# Patient Record
Sex: FEMALE | Race: BLACK OR AFRICAN AMERICAN | Employment: UNEMPLOYED | URBAN - METROPOLITAN AREA
[De-identification: names, ages, dates, MRNs, and addresses within clinical notes are randomized per-mention and may not be internally consistent; named-entity substitution may affect disease eponyms.]

---

## 2018-02-09 LAB
CHLAMYDIA, EXTERNAL: NEGATIVE
HBSAG, EXTERNAL: NEGATIVE
HIV, EXTERNAL: NONREACTIVE
N. GONORRHEA, EXTERNAL: NEGATIVE
RUBELLA, EXTERNAL: NORMAL

## 2018-06-12 LAB
ANTIBODY SCREEN, EXTERNAL: NEGATIVE
T. PALLIDUM, EXTERNAL: NEGATIVE

## 2018-07-25 LAB — GRBS, EXTERNAL: POSITIVE

## 2018-08-14 ENCOUNTER — HOSPITAL ENCOUNTER (EMERGENCY)
Age: 38
Discharge: HOME | End: 2018-08-14
Attending: OBSTETRICS & GYNECOLOGY | Admitting: OBSTETRICS & GYNECOLOGY
Payer: MEDICAID

## 2018-08-14 VITALS
SYSTOLIC BLOOD PRESSURE: 120 MMHG | HEIGHT: 72 IN | BODY MASS INDEX: 35.49 KG/M2 | WEIGHT: 262 LBS | RESPIRATION RATE: 16 BRPM | DIASTOLIC BLOOD PRESSURE: 65 MMHG | HEART RATE: 100 BPM | TEMPERATURE: 98.3 F

## 2018-08-14 LAB
A1 MICROGLOB PLACENTAL VAG QL: NEGATIVE
APPEARANCE UR: CLEAR
BACTERIA URNS QL MICRO: NEGATIVE /HPF
BILIRUB UR QL: NEGATIVE
CLUE CELLS VAG QL WET PREP: NORMAL
COLOR UR: ABNORMAL
CONTROL LINE PRESENT?: NORMAL
EPITH CASTS URNS QL MICRO: ABNORMAL /LPF
EXPIRATION DATE: NORMAL
GLUCOSE UR STRIP.AUTO-MCNC: NEGATIVE MG/DL
HGB UR QL STRIP: ABNORMAL
HYALINE CASTS URNS QL MICRO: ABNORMAL /LPF (ref 0–5)
INTERNAL NEGATIVE CONTROL: NORMAL
KETONES UR QL STRIP.AUTO: NEGATIVE MG/DL
KIT LOT NO.: NORMAL
LEUKOCYTE ESTERASE UR QL STRIP.AUTO: ABNORMAL
NITRITE UR QL STRIP.AUTO: NEGATIVE
PH UR STRIP: 6 [PH] (ref 5–8)
PROT UR STRIP-MCNC: ABNORMAL MG/DL
RBC #/AREA URNS HPF: ABNORMAL /HPF (ref 0–5)
SP GR UR REFRACTOMETRY: 1.02 (ref 1–1.03)
T VAGINALIS VAG QL WET PREP: NORMAL
UA: UC IF INDICATED,UAUC: ABNORMAL
UROBILINOGEN UR QL STRIP.AUTO: 0.2 EU/DL (ref 0.2–1)
WBC URNS QL MICRO: ABNORMAL /HPF (ref 0–4)

## 2018-08-14 PROCEDURE — 87086 URINE CULTURE/COLONY COUNT: CPT | Performed by: OBSTETRICS & GYNECOLOGY

## 2018-08-14 PROCEDURE — 99285 EMERGENCY DEPT VISIT HI MDM: CPT

## 2018-08-14 PROCEDURE — 87210 SMEAR WET MOUNT SALINE/INK: CPT | Performed by: OBSTETRICS & GYNECOLOGY

## 2018-08-14 PROCEDURE — 84112 EVAL AMNIOTIC FLUID PROTEIN: CPT | Performed by: OBSTETRICS & GYNECOLOGY

## 2018-08-14 PROCEDURE — 87491 CHLMYD TRACH DNA AMP PROBE: CPT | Performed by: OBSTETRICS & GYNECOLOGY

## 2018-08-14 PROCEDURE — 81001 URINALYSIS AUTO W/SCOPE: CPT | Performed by: OBSTETRICS & GYNECOLOGY

## 2018-08-14 RX ORDER — SODIUM CHLORIDE 0.9 % (FLUSH) 0.9 %
SYRINGE (ML) INJECTION
Status: DISCONTINUED
Start: 2018-08-14 | End: 2018-08-14 | Stop reason: HOSPADM

## 2018-08-14 NOTE — DISCHARGE INSTRUCTIONS
Week 37 of Your Pregnancy: Care Instructions  Your Care Instructions    You are near the end of your pregnancy-and you're probably pretty uncomfortable. It may be harder to walk around. Lying down probably isn't comfortable either. You may have trouble getting to sleep or staying asleep. Most women deliver their babies between 40 and 41 weeks. This is a good time to think about packing a bag for the hospital with items you'll need. Then you'll be ready when labor starts. Follow-up care is a key part of your treatment and safety. Be sure to make and go to all appointments, and call your doctor if you are having problems. It's also a good idea to know your test results and keep a list of the medicines you take. How can you care for yourself at home? Learn about breastfeeding  · Breastfeeding is best for your baby and good for you. · Breast milk has antibodies to help your baby fight infections. · Mothers who breastfeed often lose weight faster, because making milk burns calories. · Learning the best ways to hold your baby will make breastfeeding easier. · Let your partner bathe and diaper the baby to keep your partner from feeling left out. Snuggle together when you breastfeed. · You may want to learn how to use a breast pump and store your milk. · If you choose to bottle feed, make the feeding feel like breastfeeding so you can bond with your baby. Always hold your baby and the bottle. Do not prop bottles or let your baby fall asleep with a bottle. Learn about crying  · It is common for babies to cry for 1 to 3 hours a day. Some cry more, some cry less. · Babies don't cry to make you upset or because you are a bad parent. · Crying is how your baby communicates. Your baby may be hungry; have gas; need a diaper change; or feel cold, warm, tired, lonely, or tense. Sometimes babies cry for unknown reasons. · If you respond to your baby's needs, he or she will learn to trust you.   · Try to stay calm when your baby cries. Your baby may get more upset if he or she senses that you are upset. Know how to care for your   · Your baby's umbilical cord stump will drop off on its own, usually between 1 and 2 weeks. To care for your baby's umbilical cord area:  ¨ Clean the area at the bottom of the cord 2 or 3 times a day. ¨ Pay special attention to the area where the cord attaches to the skin. ¨ Keep the diaper folded below the cord. ¨ Use a damp washcloth or cotton ball to sponge bathe your baby until the stump has come off. · Your baby's first dark stool is called meconium. After the meconium is passed, your baby will develop his or her own bowel pattern. ¨ Some babies, especially  babies, have several bowel movements a day. Others have one or two a day, or one every 2 to 3 days. ¨  babies often have loose, yellow stools. Formula-fed babies have more formed stools. ¨ If your baby's stools look like little pellets, he or she is constipated. After 2 days of constipation, call your baby's doctor. · If your baby will be circumcised, you can care for him at home. ¨ Gently rinse his penis with warm water after every diaper change. Do not try to remove the film that forms on the penis. This film will go away on its own. Pat dry. ¨ Put petroleum ointment, such as Vaseline, on the area of the diaper that will touch your baby's penis. This will keep the diaper from sticking to your baby. ¨ Ask the doctor about giving your baby acetaminophen (Tylenol) for pain. Where can you learn more? Go to http://aleksandra-jordi.info/. Enter 68  97 in the search box to learn more about \"Week 37 of Your Pregnancy: Care Instructions. \"  Current as of: 2017  Content Version: 11.7  © 4668-1004 [employer]. Care instructions adapted under license by [employer] (which disclaims liability or warranty for this information).  If you have questions about a medical condition or this instruction, always ask your healthcare professional. [place] [address] any warranty or liability for your use of this information.

## 2018-08-14 NOTE — H&P
EDC:2018  EGA: 37 weeks, 4 days      Primary Provider:  Carlos Navarrete MD      History of Present Illness:   at 37w1d who presents with c/o leaking on herself today. Denies any regular ucs, vb, or vd. Good FM.  was seen in the office yesterday and cervix 50/2/-3        Patient's Prenatal Care with Doctor of Record Carlos Navarrete MD Notable For -    GBS positive RX in labor  Rule out ruptured membranes  ROM at term  Anemia on FE pregnant  High risk pregnancy AMA multigravida_cfDNA NEG XX  DM increased risk early GS wnl  Sickle cell trait FOB NEG_urine culture q trimester:  Spotting complicating pregnancy_reslv'd  Prev LTCS h/o , desires  consented  Uterine anomaly pregnant:  ?arcuate uterus  Asthma pregnant          Impression & Recommendations:    Problem # 1:  Prev LTCS h/o , desires  consented (IGB-335.05) (GTJ31-N34.211)  Multip at term with c/o leaking of fluid. R/o SROM  h/o previous section. desires     neg pool or nitrazine. amnisure neg.   bedside u/s with adequate fluid.    _gonorrhea/ chl sent  _wet prep sent  _no evidence of labor  _urine culture  Orders:  Patient Sent to Hospital (CPT-HOSPGYN)      Problem # 2:  GBS positive RX in labor (NHY-952.64) (SSB58-Z38.82)  group b streptococcus pos  no evidence of srom    Problem # 3:  Uterine anomaly pregnant:  ?arcuate uterus (ICD-654.03) (QKZ33-F65.03)  arcuate uterus    Problem # 4:  Sickle cell trait FOB NEG_urine culture q trimester: (SAB-815.83) (GVQ03-B88.2xx0)    Other Orders:  L&D Outpatient Obs - Low (CPT-AdmitF)      Past Pregnancy History      :  4     Term Births:  3     Premature Births: 0     Living Children: 3     Para:   3     Mult. Births:  0     Prev : 1     Prev.  attempt? success x1     Aborta:  0     Elect. Ab:  0     Spont.  Ab:  0     Ectopics:  0    Pregnancy # 1     Delivery date:   2000     Weeks Gestation: 40      labor:   no     Delivery type:    Anesthesia type:   epidural     Delivery location:   [address] [place] [address]     Infant Sex:  Male     Birth weight:  7lb 3oz     Name:  Shanae Masters     Comments:  Deliv at [place], denies complications    Pregnancy # 2     Delivery date:   2008     Weeks Gestation: 40      labor:   no     Delivery type:   LTCS     Anesthesia type:   epidural     Delivery location:   [address] [place] [address]     Infant Sex:  Female     Birth weight:  6lb 5oz     Name:  Jose Ronaldo     Comments:  Del by Dr. Mitch Fraga, apgars , Indications: NRFS and APA. [place] at term. 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 u/s        ? 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 Allergies    LATEX (DISPOSABLE GLOVES) (Moderate)      Medications Removed from Medication List        Flowsheet View for Follow-up Visit     Estimated weeks of        gestation:  37 4/7     Fetal position:  vertex     Cx Dilation:  2     Cx Effacement: 50%     Cx Station:  -3          Physical Exam     General           General appearance:  no acute distress    Head           Inspection:   normal    Eyes           External:   EOM intact    Extremeties           Extremeties:  1+ edema bilaterally    Psych           Orientation:  oriented to time, place, and person          Mood:  no appearance of anxiety, depression, or agitation    Skin           Inspection:  no rashes, suspicious lesions, or ulcerations    Abdomen           Abdomen:  gravid                  Dilation: : 2                  Effacement:  50%                  Station:  -3                  Presentation:  vertex                  Membranes:  intact            Impression & Recommendations:    Problem # 1:  Prev LTCS h/o , desires  consented (BFD-197.93) (AUT25-Q25.211)  Multip at term with c/o leaking of fluid. R/o SROM  h/o previous section. desires     neg pool or nitrazine.  amnisure neg.   bedside u/s with adequate fluid.    _gonorrhea/ chl sent  _wet prep sent  _no evidence of labor  _urine culture  Orders:  Patient Sent to Hospital (CPT-HOSPGYN)      Problem # 2:  GBS positive RX in labor (TDE-053.30) (HGJ85-D68.82)  group b streptococcus pos  no evidence of srom    Problem # 3:  Uterine anomaly pregnant:  ?arcuate uterus (ICD-654.03) (HKJ40-W24.03)  arcuate uterus    Problem # 4:  Sickle cell trait FOB NEG_urine culture q trimester: (BZQ-562.84) (ZOK71-X76.2xx0)    Other Orders:  L&D Outpatient Obs - Low (CPT-AdmitF)      Medications (at conclusion of this visit)    07/10/2018 SM IRON SLOW RELEASE 160 (50 FE) MG ORAL TABLET EXTENDED RELEASE (FERROUS SULFATE DRIED)   04/26/2018 FLONASE 50 MCG/ACT NASAL SUSPENSION (FLUTICASONE PROPIONATE) 1 spray of 50 ug in each nostril 1-2x/daily  04/26/2018 ZYRTEC ALLERGY 10 MG ORAL CAPSULE (CETIRIZINE HCL) 1 tab po daily  02/20/2018 PREFERAOB +DHA 28-6-1 & 200 MG ORAL (PRENAT ZYVOAA-THUVOZJ-SM-DHA) 1 PO daily          LABORATORY DATA   TEST DATE RESULT   Group B Strep culture 07/25/2018 Positive                                   (Group B Strep Culture Result Field)   Blood Type 02/09/2018 O                                             (Blood Type Result Field)   Rh 02/09/2018 Positive                                   (Rh Result Field)   Rhogam Inj Given 05/11/2016 *   Tdap Vaccine Given 06/12/2018 Vacc.  [address]   Antibody Screen 06/12/2018 Negative   Rubella  [facility] Reference Ranges On or After 3/10/14                  <0.90              Non-immune      0.90 - 0.99     Equivocal      >0.99              Immune    [facility] Reference Ranges  Before 3/10/14           <5                 Non-immune             5 - 9               Equivocal            >9                 Immune  [facility] Reference Ranges       < Or = 0.90       Negative             0.91-1.09          Equivocal            > Or = 1.10       Positive   02/09/2018     1.59     TPA (T Pallidum Antibodies) 06/12/2018 Negative   Serology (RPR) 05/11/2016 *   HBsAg 02/09/2018 Negative   HIV 02/09/2018 Non Reactive   Hemoglobin 06/12/2018 10.6   Hematocrit 06/12/2018 32.3   Platelets 92/80/5812 194 X10E3/UL   TSH 05/25/2017 1.560   Urine Culture 06/12/2018 Negative   GC DNA Probe 02/09/2018 Negative   Chlamydia DNA 02/09/2018 Negative   PAP 05/25/2017 NIL   Flu Vaccine Given 04/16/2018 declined   HGBA1C 02/09/2018 5.1   HGB Electro 01/21/2008 Positive   T4, Free 05/11/2016 *   BG Fasting 05/11/2016 *   GTT 1H 50G 06/12/2018 85   GTT 1H 100G 05/11/2016 *   GTT 2H 100G 05/11/2016 *   GTT 3H 100G 05/11/2016 *   Glucose Plasma 05/11/2016 *   CF Accept or Decline 03/20/2018 declined   CF Screen Result 03/20/2018 Declined   Nuchal Trans 02/09/2018 Declined   AFP Only 03/20/2018 *Screen Negative*   Tetra     AFP Serum 05/11/2016 *   CVS 05/11/2016 *   AFP Amniotic 05/11/2016 *   Amnio Karyo 05/11/2016 *   FISH 05/11/2016 *   GC Culture 05/11/2016 *   Chlamydia Cult 05/11/2016 *   Ureaplasma     Mycoplasma     WBC 02/09/2018 11.9 X10E3/UL   RBC 02/09/2018 4.21 X10E6/UL   MCV 02/09/2018 84   MCH 02/09/2018 27.1   MCHC RBC 02/09/2018 32.1     ULTRASOUND DATA   TEST DATE RESULT   Estimated Fetal Weight 07/31/2018 6467^6432 g&grams                                     Weight % 07/31/2018 77^77% %&percent                                                AFUA 07/31/2018 56.92^34. 8 cm&centimeters                    BPP 07/31/2018 8^8 [n/a]&Not applicable   Cervical Length (mm)             Electronically signed by Jarod Harry MD on 08/14/2018 at 6:11 PM    ________________________________________________________________________

## 2018-08-14 NOTE — IP AVS SNAPSHOT
[address] 
[phone] Patient: Unique Patel MRN: [id number] QQM:[birth year] A check rex indicates which time of day the medication should be taken. My Medications STOP taking these medications   
 albuterol 90 mcg/actuation inhaler Commonly known as:  PROVENTIL HFA, VENTOLIN HFA, PROAIR HFA  
   
  
 cetirizine 10 mg tablet Commonly known as:  ZyrTEC  
   
  
 ferrous sulfate 325 mg (65 mg iron) tablet  
   
  
 fluticasone 50 mcg/actuation nasal spray Commonly known as:  FLONASE  
   
  
 ibuprofen 600 mg tablet Commonly known as:  MOTRIN  
   
  
 oxyCODONE-acetaminophen 5-325 mg per tablet Commonly known as:  PERCOCET  
   
  
 pnv w/o calcium-iron fum-fa 27-1 mg Tab  
   
  
 therapeutic multivitamin tablet Commonly known as:  [facility]

## 2018-08-14 NOTE — IP AVS SNAPSHOT
[facility] 
[phone] Patient: Issac Tom MRN: [id number] RJS:4[birth year]13[birth year]018 About your hospitalization You were admitted on:  N/A You last received care in the:  [facility] 3 LABOR & DELIVERY You were discharged on:  August 14, 2018 Why you were hospitalized Your primary diagnosis was:  Not on File Follow-up Information Follow up With Details Comments Contact Info Mike Figueroa MD In 3 days  [address] [id number] [phone] Discharge Orders None A check rex indicates which time of day the medication should be taken. My Medications STOP taking these medications   
 albuterol 90 mcg/actuation inhaler Commonly known as:  PROVENTIL HFA, VENTOLIN HFA, PROAIR HFA  
   
  
 cetirizine 10 mg tablet Commonly known as:  ZyrTEC  
   
  
 ferrous sulfate 325 mg (65 mg iron) tablet  
   
  
 fluticasone 50 mcg/actuation nasal spray Commonly known as:  FLONASE  
   
  
 ibuprofen 600 mg tablet Commonly known as:  MOTRIN  
   
  
 oxyCODONE-acetaminophen 5-325 mg per tablet Commonly known as:  PERCOCET  
   
  
 pnv w/o calcium-iron fum-fa 27-1 mg Tab  
   
  
 therapeutic multivitamin tablet Commonly known as:  [facility] Discharge Instructions Week 37 of Your Pregnancy: Care Instructions Your Care Instructions You are near the end of your pregnancy-and you're probably pretty uncomfortable. It may be harder to walk around. Lying down probably isn't comfortable either. You may have trouble getting to sleep or staying asleep. Most women deliver their babies between 40 and 41 weeks. This is a good time to think about packing a bag for the hospital with items you'll need. Then you'll be ready when labor starts. Follow-up care is a key part of your treatment and safety.  Be sure to make and go to all appointments, and call your doctor if you are having problems. It's also a good idea to know your test results and keep a list of the medicines you take. How can you care for yourself at home? Learn about breastfeeding · Breastfeeding is best for your baby and good for you. · Breast milk has antibodies to help your baby fight infections. · Mothers who breastfeed often lose weight faster, because making milk burns calories. · Learning the best ways to hold your baby will make breastfeeding easier. · Let your partner bathe and diaper the baby to keep your partner from feeling left out. Snuggle together when you breastfeed. · You may want to learn how to use a breast pump and store your milk. · If you choose to bottle feed, make the feeding feel like breastfeeding so you can bond with your baby. Always hold your baby and the bottle. Do not prop bottles or let your baby fall asleep with a bottle. Learn about crying · It is common for babies to cry for 1 to 3 hours a day. Some cry more, some cry less. · Babies don't cry to make you upset or because you are a bad parent. · Crying is how your baby communicates. Your baby may be hungry; have gas; need a diaper change; or feel cold, warm, tired, lonely, or tense. Sometimes babies cry for unknown reasons. · If you respond to your baby's needs, he or she will learn to trust you. · Try to stay calm when your baby cries. Your baby may get more upset if he or she senses that you are upset. Know how to care for your  · Your baby's umbilical cord stump will drop off on its own, usually between 1 and 2 weeks. To care for your baby's umbilical cord area: ¨ Clean the area at the bottom of the cord 2 or 3 times a day. ¨ Pay special attention to the area where the cord attaches to the skin. ¨ Keep the diaper folded below the cord. ¨ Use a damp washcloth or cotton ball to sponge bathe your baby until the stump has come off. · Your baby's first dark stool is called meconium. After the meconium is passed, your baby will develop his or her own bowel pattern. ¨ Some babies, especially  babies, have several bowel movements a day. Others have one or two a day, or one every 2 to 3 days. ¨  babies often have loose, yellow stools. Formula-fed babies have more formed stools. ¨ If your baby's stools look like little pellets, he or she is constipated. After 2 days of constipation, call your baby's doctor. · If your baby will be circumcised, you can care for him at home. ¨ Gently rinse his penis with warm water after every diaper change. Do not try to remove the film that forms on the penis. This film will go away on its own. Pat dry. ¨ Put petroleum ointment, such as Vaseline, on the area of the diaper that will touch your baby's penis. This will keep the diaper from sticking to your baby. ¨ Ask the doctor about giving your baby acetaminophen (Tylenol) for pain. Where can you learn more? Go to http://aleksandraFantomjordi.info/. Enter 68 21 [id number] in the search box to learn more about \"Week 37 of Your Pregnancy: Care Instructions. \" Current as of: November 21, 2017 Content Version: 11.7 © 4449-6351 Healthwise, Incorporated. Care instructions adapted under license by [employer] (which disclaims liability or warranty for this information). If you have questions about a medical condition or this instruction, always ask your healthcare professional. [address] [address] any warranty or liability for your use of this information. Introducing hospitals & HEALTH SERVICES! [facility] introduces [employer] patient portal. Now you can access parts of your medical record, email your doctor's office, and request medication refills online. 1. In your internet browser, go to [email or web address] 2. Click on the First Time User? Click Here link in the Sign In box.  You will see the New Member Sign Up page. 3. Enter your Ship It Bag Check Access Code exactly as it appears below. You will not need to use this code after youve completed the sign-up process. If you do not sign up before the expiration date, you must request a new code. · Ship It Bag Check Access Code: [email or web address] Expires: 11/12/2018  7:42 PM 
 
4. Enter the last four digits of your Social Security Number (xxxx) and Date of Birth (mm/dd/yyyy) as indicated and click Submit. You will be taken to the next sign-up page. 5. Create a Mementot ID. This will be your Ship It Bag Check login ID and cannot be changed, so think of one that is secure and easy to remember. 6. Create a Mementot password. You can change your password at any time. 7. Enter your Password Reset Question and Answer. This can be used at a later time if you forget your password. 8. Enter your e-mail address. You will receive e-mail notification when new information is available in [facility] [address] [facility]. 9. Click Sign Up. You can now view and download portions of your medical record. 10. Click the Download Summary menu link to download a portable copy of your medical information. If you have questions, please visit the Frequently Asked Questions section of the Ship It Bag Check website. Remember, Ship It Bag Check is NOT to be used for urgent needs. For medical emergencies, dial 911. Now available from your iPhone and Android! Introducing Steve Laura As a [facility] patient, I wanted to make you aware of our electronic visit tool called Steve Laura. [facility] 24/7 allows you to connect within minutes with a medical provider 24 hours a day, seven days a week via a mobile device or tablet or logging into a secure website from your computer. You can access Steve Laura from anywhere in the [place].  
 
A virtual visit might be right for you when you have a simple condition and feel like you just dont want to get out of bed, or cant get away from work for an appointment, when your regular [employer] provider is not available (evenings, weekends or holidays), or when youre out of town and need minor care. Electronic visits cost only $49 and if the [employer] 24/7 provider determines a prescription is needed to treat your condition, one can be electronically transmitted to a nearby pharmacy*. Please take a moment to enroll today if you have not already done so. The enrollment process is free and takes just a few minutes. To enroll, please download the [employer] 24/7 jeanie to your tablet or phone, or visit [email or web address] to enroll on your computer. And, as an [address] [place] [address] patient with a [employer] account, the results of your visits will be scanned into your electronic medical record and your primary care provider will be able to view the scanned results. We urge you to continue to see your regular [employer] provider for your ongoing medical care. And while your primary care provider may not be the one available when you seek a [facility] virtual visit, the peace of mind you get from getting a real diagnosis real time can be priceless. For more information on [employer], view our Frequently Asked Questions (FAQs) at [email or web address]. Sincerely, 
 
Jam Lee MD 
Chief Medical Officer [employer] *:  certain medications cannot be prescribed via [employer] Unresulted Labs-Please follow up with your PCP about these lab tests Order Current Status CHLAMYDIA/GC PCR In process CHLAMYDIA/GC PCR In process CULTURE, URINE In process Providers Seen During Your Hospitalization Provider Specialty Primary office phone Nhi Asencio MD Obstetrics & Gynecology [phone] Your Primary Care Physician (PCP) Primary Care Physician Office Phone Office Fax Mohini Monae [phone] You are allergic to the following Allergen Reactions Latex Rash Itching Recent Documentation Height Weight Breastfeeding? BMI OB Status Smoking Status 1.829 m 118.8 kg No 35.53 kg/m2 Pregnant Never Smoker Emergency Contacts Name Discharge Info Relation Home Work Mobile Juany Washington DISCHARGE CAREGIVER [3] Parent [1] [phone] Zacarias Kaur DISCHARGE CAREGIVER [3] Other Relative [6] [phone] Patient Belongings The following personal items are in your possession at time of discharge: 
  Dental Appliances: None  Visual Aid: Glasses      Home Medications: None   Jewelry: None  Clothing: At bedside    Other Valuables: None  Personal Items Sent to Safe: none Please provide this summary of care documentation to your next provider. Signatures-by signing, you are acknowledging that this After Visit Summary has been reviewed with you and you have received a copy. Patient Signature:  ____________________________________________________________ Date:  ____________________________________________________________  
  
[facility] Provider Signature:  ____________________________________________________________ Date:  ____________________________________________________________

## 2018-08-14 NOTE — PROGRESS NOTES
1540 Patient arrived ambulatory to unit with c/o ROM since this morning around 0730. States positive fetal movement. Denies vaginal bleeding. States some mild contractions off and on for \"2-3 weeks. \"    2151 Oriented to room, monitors applied. [address] [facility] performed. 1600 Amnisure negative    [address] Dr. Sonu Barcenas at bedside, viewed fetal tracing. Discussing POC. Bedside ultrasound. Speculum exam performed by MD with swabs collected. Yunior Barcenas at bedside, discussed lab results. Will discharge home with instructions to follow-up next Monday or Tuesday. 1945 Discharge instructions reviewed with patient. All questions answered. Verbalized understanding. Patient to follow up early next week.

## 2018-08-14 NOTE — PROGRESS NOTES
PN    D/c to home. No evidence of rupture. Reassuring fetal surveillance. Wet prep neg    Gc/chl cxs pending    U/A neg for bacteria and nitrates  Urine cx pending    Labor, rom and [address] precautions.

## 2018-08-15 LAB
BACTERIA SPEC CULT: NORMAL
C TRACH DNA SPEC QL NAA+PROBE: NEGATIVE
CC UR VC: NORMAL
N GONORRHOEA DNA SPEC QL NAA+PROBE: NEGATIVE
SAMPLE TYPE: NORMAL
SERVICE CMNT-IMP: NORMAL
SERVICE CMNT-IMP: NORMAL
SPECIMEN SOURCE: NORMAL

## 2018-08-28 ENCOUNTER — HOSPITAL ENCOUNTER (OUTPATIENT)
Dept: OTHER | Age: 38
Discharge: HOME | DRG: 560 | End: 2018-08-28
Payer: MEDICAID

## 2018-08-28 LAB
ERYTHROCYTE [DISTWIDTH] IN BLOOD BY AUTOMATED COUNT: 13.7 % (ref 11.5–14.5)
HCT VFR BLD AUTO: 33.2 % (ref 35–47)
HGB BLD-MCNC: 10.9 G/DL (ref 11.5–16)
MCH RBC QN AUTO: 28.4 PG (ref 26–34)
MCHC RBC AUTO-ENTMCNC: 32.8 G/DL (ref 30–36.5)
MCV RBC AUTO: 86.5 FL (ref 80–99)
NRBC # BLD: 0 K/UL (ref 0–0.01)
NRBC BLD-RTO: 0 PER 100 WBC
PLATELET # BLD AUTO: 139 K/UL (ref 150–400)
PMV BLD AUTO: 12 FL (ref 8.9–12.9)
RBC # BLD AUTO: 3.84 M/UL (ref 3.8–5.2)
WBC # BLD AUTO: 10.1 K/UL (ref 3.6–11)

## 2018-08-28 PROCEDURE — 85027 COMPLETE CBC AUTOMATED: CPT | Performed by: OBSTETRICS & GYNECOLOGY

## 2018-08-28 PROCEDURE — 36415 COLL VENOUS BLD VENIPUNCTURE: CPT | Performed by: OBSTETRICS & GYNECOLOGY

## 2018-08-29 ENCOUNTER — ANESTHESIA (OUTPATIENT)
Dept: LABOR AND DELIVERY | Age: 38
DRG: 560 | End: 2018-08-29
Payer: MEDICAID

## 2018-08-29 ENCOUNTER — ANESTHESIA EVENT (OUTPATIENT)
Dept: LABOR AND DELIVERY | Age: 38
DRG: 560 | End: 2018-08-29
Payer: MEDICAID

## 2018-08-29 ENCOUNTER — HOSPITAL ENCOUNTER (INPATIENT)
Age: 38
LOS: 2 days | Discharge: HOME | DRG: 560 | End: 2018-08-31
Attending: OBSTETRICS & GYNECOLOGY | Admitting: OBSTETRICS & GYNECOLOGY
Payer: MEDICAID

## 2018-08-29 PROBLEM — Q51.810 ARCUATE UTERUS: Status: ACTIVE | Noted: 2018-08-29

## 2018-08-29 PROBLEM — Z34.90 TERM PREGNANCY: Status: ACTIVE | Noted: 2018-08-29

## 2018-08-29 PROBLEM — Z98.891 PREVIOUS CESAREAN SECTION: Status: ACTIVE | Noted: 2018-08-29

## 2018-08-29 PROCEDURE — 74011250636 HC RX REV CODE- 250/636: Performed by: OBSTETRICS & GYNECOLOGY

## 2018-08-29 PROCEDURE — 10907ZC DRAINAGE OF AMNIOTIC FLUID, THERAPEUTIC FROM PRODUCTS OF CONCEPTION, VIA NATURAL OR ARTIFICIAL OPENING: ICD-10-PCS | Performed by: OBSTETRICS & GYNECOLOGY

## 2018-08-29 PROCEDURE — 3E0R3BZ INTRODUCTION OF ANESTHETIC AGENT INTO SPINAL CANAL, PERCUTANEOUS APPROACH: ICD-10-PCS | Performed by: ANESTHESIOLOGY

## 2018-08-29 PROCEDURE — 74011250636 HC RX REV CODE- 250/636: Performed by: ANESTHESIOLOGY

## 2018-08-29 PROCEDURE — 76060000078 HC EPIDURAL ANESTHESIA

## 2018-08-29 PROCEDURE — 75410000000 HC DELIVERY VAGINAL/SINGLE

## 2018-08-29 PROCEDURE — 77030011943

## 2018-08-29 PROCEDURE — 65410000002 HC RM PRIVATE OB

## 2018-08-29 PROCEDURE — A4300 CATH IMPL VASC ACCESS PORTAL: HCPCS

## 2018-08-29 PROCEDURE — 74011000250 HC RX REV CODE- 250

## 2018-08-29 PROCEDURE — 75410000003 HC RECOV DEL/VAG/CSECN EA 0.5 HR

## 2018-08-29 PROCEDURE — 74011250637 HC RX REV CODE- 250/637: Performed by: OBSTETRICS & GYNECOLOGY

## 2018-08-29 PROCEDURE — 74011000250 HC RX REV CODE- 250: Performed by: ANESTHESIOLOGY

## 2018-08-29 PROCEDURE — 75410000002 HC LABOR FEE PER 1 HR

## 2018-08-29 PROCEDURE — 00HU33Z INSERTION OF INFUSION DEVICE INTO SPINAL CANAL, PERCUTANEOUS APPROACH: ICD-10-PCS | Performed by: ANESTHESIOLOGY

## 2018-08-29 PROCEDURE — 74011250636 HC RX REV CODE- 250/636

## 2018-08-29 PROCEDURE — 3E033VJ INTRODUCTION OF OTHER HORMONE INTO PERIPHERAL VEIN, PERCUTANEOUS APPROACH: ICD-10-PCS | Performed by: OBSTETRICS & GYNECOLOGY

## 2018-08-29 PROCEDURE — 74011000258 HC RX REV CODE- 258: Performed by: OBSTETRICS & GYNECOLOGY

## 2018-08-29 RX ORDER — FENTANYL CITRATE 50 UG/ML
100 INJECTION, SOLUTION INTRAMUSCULAR; INTRAVENOUS ONCE
Status: COMPLETED | OUTPATIENT
Start: 2018-08-29 | End: 2018-08-29

## 2018-08-29 RX ORDER — DOCUSATE SODIUM 100 MG/1
100 CAPSULE, LIQUID FILLED ORAL
Status: DISCONTINUED | OUTPATIENT
Start: 2018-08-29 | End: 2018-08-31 | Stop reason: HOSPADM

## 2018-08-29 RX ORDER — SODIUM CHLORIDE 0.9 % (FLUSH) 0.9 %
5-10 SYRINGE (ML) INJECTION EVERY 8 HOURS
Status: DISCONTINUED | OUTPATIENT
Start: 2018-08-29 | End: 2018-08-31 | Stop reason: HOSPADM

## 2018-08-29 RX ORDER — NALOXONE HYDROCHLORIDE 0.4 MG/ML
0.4 INJECTION, SOLUTION INTRAMUSCULAR; INTRAVENOUS; SUBCUTANEOUS AS NEEDED
Status: DISCONTINUED | OUTPATIENT
Start: 2018-08-29 | End: 2018-08-29 | Stop reason: HOSPADM

## 2018-08-29 RX ORDER — BUTORPHANOL TARTRATE 1 MG/ML
2 INJECTION INTRAMUSCULAR; INTRAVENOUS
Status: DISCONTINUED | OUTPATIENT
Start: 2018-08-29 | End: 2018-08-31 | Stop reason: HOSPADM

## 2018-08-29 RX ORDER — IBUPROFEN 400 MG/1
800 TABLET ORAL EVERY 8 HOURS
Status: DISCONTINUED | OUTPATIENT
Start: 2018-08-29 | End: 2018-08-31 | Stop reason: HOSPADM

## 2018-08-29 RX ORDER — LIDOCAINE HYDROCHLORIDE AND EPINEPHRINE 15; 5 MG/ML; UG/ML
INJECTION, SOLUTION EPIDURAL AS NEEDED
Status: DISCONTINUED | OUTPATIENT
Start: 2018-08-29 | End: 2018-08-29 | Stop reason: HOSPADM

## 2018-08-29 RX ORDER — SIMETHICONE 80 MG
80 TABLET,CHEWABLE ORAL
Status: DISCONTINUED | OUTPATIENT
Start: 2018-08-29 | End: 2018-08-31 | Stop reason: HOSPADM

## 2018-08-29 RX ORDER — SODIUM CHLORIDE 0.9 % (FLUSH) 0.9 %
SYRINGE (ML) INJECTION
Status: DISPENSED
Start: 2018-08-29 | End: 2018-08-30

## 2018-08-29 RX ORDER — TERBUTALINE SULFATE 1 MG/ML
0.25 INJECTION SUBCUTANEOUS AS NEEDED
Status: DISCONTINUED | OUTPATIENT
Start: 2018-08-29 | End: 2018-08-29 | Stop reason: HOSPADM

## 2018-08-29 RX ORDER — OXYTOCIN/RINGER'S LACTATE 20/1000 ML
125-1000 PLASTIC BAG, INJECTION (ML) INTRAVENOUS AS NEEDED
Status: DISCONTINUED | OUTPATIENT
Start: 2018-08-29 | End: 2018-08-31 | Stop reason: HOSPADM

## 2018-08-29 RX ORDER — DIPHENHYDRAMINE HCL 25 MG
25 CAPSULE ORAL
Status: DISCONTINUED | OUTPATIENT
Start: 2018-08-29 | End: 2018-08-31 | Stop reason: HOSPADM

## 2018-08-29 RX ORDER — HYDROCORTISONE ACETATE PRAMOXINE HCL 2.5; 1 G/100G; G/100G
CREAM TOPICAL AS NEEDED
Status: DISCONTINUED | OUTPATIENT
Start: 2018-08-29 | End: 2018-08-31 | Stop reason: HOSPADM

## 2018-08-29 RX ORDER — HYDROCORTISONE 1 %
CREAM (GRAM) TOPICAL AS NEEDED
Status: DISCONTINUED | OUTPATIENT
Start: 2018-08-29 | End: 2018-08-31 | Stop reason: HOSPADM

## 2018-08-29 RX ORDER — OXYTOCIN IN 5 % DEXTROSE 30/500 ML
1-25 PLASTIC BAG, INJECTION (ML) INTRAVENOUS
Status: DISCONTINUED | OUTPATIENT
Start: 2018-08-29 | End: 2018-08-31 | Stop reason: HOSPADM

## 2018-08-29 RX ORDER — FENTANYL CITRATE 50 UG/ML
100 INJECTION, SOLUTION INTRAMUSCULAR; INTRAVENOUS
Status: DISCONTINUED | OUTPATIENT
Start: 2018-08-29 | End: 2018-08-29 | Stop reason: HOSPADM

## 2018-08-29 RX ORDER — FENTANYL/BUPIVACAINE/NS/PF 2-1250MCG
1-16 PREFILLED PUMP RESERVOIR EPIDURAL CONTINUOUS
Status: DISCONTINUED | OUTPATIENT
Start: 2018-08-29 | End: 2018-08-29 | Stop reason: HOSPADM

## 2018-08-29 RX ORDER — SODIUM CHLORIDE, SODIUM LACTATE, POTASSIUM CHLORIDE, CALCIUM CHLORIDE 600; 310; 30; 20 MG/100ML; MG/100ML; MG/100ML; MG/100ML
125 INJECTION, SOLUTION INTRAVENOUS CONTINUOUS
Status: DISCONTINUED | OUTPATIENT
Start: 2018-08-29 | End: 2018-08-31 | Stop reason: HOSPADM

## 2018-08-29 RX ORDER — OXYCODONE AND ACETAMINOPHEN 5; 325 MG/1; MG/1
1 TABLET ORAL
Status: DISCONTINUED | OUTPATIENT
Start: 2018-08-29 | End: 2018-08-31 | Stop reason: HOSPADM

## 2018-08-29 RX ORDER — SODIUM CHLORIDE 0.9 % (FLUSH) 0.9 %
5-10 SYRINGE (ML) INJECTION AS NEEDED
Status: DISCONTINUED | OUTPATIENT
Start: 2018-08-29 | End: 2018-08-31 | Stop reason: HOSPADM

## 2018-08-29 RX ORDER — OXYCODONE AND ACETAMINOPHEN 5; 325 MG/1; MG/1
2 TABLET ORAL
Status: DISCONTINUED | OUTPATIENT
Start: 2018-08-29 | End: 2018-08-31 | Stop reason: HOSPADM

## 2018-08-29 RX ORDER — EPHEDRINE SULFATE 50 MG/ML
10 INJECTION, SOLUTION INTRAVENOUS
Status: DISCONTINUED | OUTPATIENT
Start: 2018-08-29 | End: 2018-08-29 | Stop reason: HOSPADM

## 2018-08-29 RX ORDER — AMMONIA 15 % (W/V)
1 AMPUL (EA) INHALATION AS NEEDED
Status: DISCONTINUED | OUTPATIENT
Start: 2018-08-29 | End: 2018-08-31 | Stop reason: HOSPADM

## 2018-08-29 RX ORDER — OXYTOCIN/RINGER'S LACTATE 20/1000 ML
PLASTIC BAG, INJECTION (ML) INTRAVENOUS
Status: COMPLETED
Start: 2018-08-29 | End: 2018-08-29

## 2018-08-29 RX ORDER — ONDANSETRON 4 MG/1
4 TABLET, ORALLY DISINTEGRATING ORAL
Status: DISCONTINUED | OUTPATIENT
Start: 2018-08-29 | End: 2018-08-31 | Stop reason: HOSPADM

## 2018-08-29 RX ORDER — MISOPROSTOL 200 UG/1
800 TABLET ORAL ONCE
Status: COMPLETED | OUTPATIENT
Start: 2018-08-29 | End: 2018-08-29

## 2018-08-29 RX ORDER — NALBUPHINE HYDROCHLORIDE 10 MG/ML
10 INJECTION, SOLUTION INTRAMUSCULAR; INTRAVENOUS; SUBCUTANEOUS
Status: DISCONTINUED | OUTPATIENT
Start: 2018-08-29 | End: 2018-08-29 | Stop reason: HOSPADM

## 2018-08-29 RX ORDER — BUPIVACAINE HYDROCHLORIDE 2.5 MG/ML
10 INJECTION, SOLUTION EPIDURAL; INFILTRATION; INTRACAUDAL ONCE
Status: COMPLETED | OUTPATIENT
Start: 2018-08-29 | End: 2018-08-29

## 2018-08-29 RX ADMIN — IBUPROFEN 800 MG: 400 TABLET ORAL at 22:48

## 2018-08-29 RX ADMIN — MISOPROSTOL 800 MCG: 200 TABLET ORAL at 18:11

## 2018-08-29 RX ADMIN — Medication 20000 MILLI-UNITS: at 19:17

## 2018-08-29 RX ADMIN — LIDOCAINE HYDROCHLORIDE AND EPINEPHRINE 3 ML: 15; 5 INJECTION, SOLUTION EPIDURAL at 14:53

## 2018-08-29 RX ADMIN — PENICILLIN G POTASSIUM 2.5 MILLION UNITS: 20000000 POWDER, FOR SOLUTION INTRAVENOUS at 16:06

## 2018-08-29 RX ADMIN — PENICILLIN G POTASSIUM 2.5 MILLION UNITS: 20000000 POWDER, FOR SOLUTION INTRAVENOUS at 11:17

## 2018-08-29 RX ADMIN — Medication 10 ML/HR: at 15:05

## 2018-08-29 RX ADMIN — SODIUM CHLORIDE 5 MILLION UNITS: 900 INJECTION, SOLUTION INTRAVENOUS at 07:28

## 2018-08-29 RX ADMIN — SODIUM CHLORIDE, SODIUM LACTATE, POTASSIUM CHLORIDE, AND CALCIUM CHLORIDE 125 ML/HR: 600; 310; 30; 20 INJECTION, SOLUTION INTRAVENOUS at 14:30

## 2018-08-29 RX ADMIN — FENTANYL CITRATE 100 MCG: 50 INJECTION, SOLUTION INTRAMUSCULAR; INTRAVENOUS at 14:54

## 2018-08-29 RX ADMIN — Medication 2 MILLI-UNITS/MIN: at 07:28

## 2018-08-29 RX ADMIN — Medication 18 MILLI-UNITS/MIN: at 16:54

## 2018-08-29 RX ADMIN — SODIUM CHLORIDE, SODIUM LACTATE, POTASSIUM CHLORIDE, AND CALCIUM CHLORIDE 125 ML/HR: 600; 310; 30; 20 INJECTION, SOLUTION INTRAVENOUS at 06:39

## 2018-08-29 RX ADMIN — BUPIVACAINE HYDROCHLORIDE 5 ML: 2.5 INJECTION, SOLUTION EPIDURAL; INFILTRATION; INTRACAUDAL; PERINEURAL at 14:54

## 2018-08-29 NOTE — PROGRESS NOTES
0740  Bedside and Verbal shift change report given to GISELLE Rouse,RN (oncoming nurse) by COCO Melo RN (offgoing nurse). Report included the following information SBAR, Kardex, MAR and Recent Results. 0930  Dr. Chong Dolan at bedside and viewed strip. 26  Dr. Chong Dolan at bedside sve 4-5/80/-3  Arom clear fluid and viewed strip. 1452  Epidural placed by Dr. Alina George. 1630  St cath'd and sve 5/80/-3 
1750  sve 10/100/+1  Dr. Chong Dolan called for delivery.

## 2018-08-29 NOTE — ANESTHESIA PREPROCEDURE EVALUATION
Anesthetic History No history of anesthetic complications Review of Systems / Medical History Patient summary reviewed, nursing notes reviewed and pertinent labs reviewed Pulmonary Asthma Neuro/Psych Within defined limits Cardiovascular Within defined limits GI/Hepatic/Renal 
Within defined limits Endo/Other Within defined limits Other Findings Physical Exam 
 
Airway Mallampati: II 
TM Distance: > 6 cm Neck ROM: normal range of motion Mouth opening: Normal 
 
 Cardiovascular Regular rate and rhythm,  S1 and S2 normal,  no murmur, click, rub, or gallop Dental 
No notable dental hx Pulmonary Breath sounds clear to auscultation Abdominal 
GI exam deferred Other Findings Anesthetic Plan ASA: 2 Anesthesia type: epidural 
 
 
 
 
Induction: Intravenous Anesthetic plan and risks discussed with: Patient

## 2018-08-29 NOTE — ANESTHESIA POSTPROCEDURE EVALUATION
Post-Anesthesia Evaluation and Assessment Patient: Cori Garza MRN: [id number]  SSN: [id number] YOB: 1980  Age: 41 y.o. Sex: female Cardiovascular Function/Vital Signs Visit Vitals  BP 92/52 (BP 1 Location: Right arm, BP Patient Position: At rest;Lying left side;Trendelenburg)  Pulse 71  Temp 36.7 °C (98.1 °F)  Resp 18  Ht 6' (1.829 m)  Wt 122.9 kg (271 lb)  SpO2 100%  Breastfeeding Unknown  BMI 36.75 kg/m2 Patient is status post epidural anesthesia for * No procedures listed *. Nausea/Vomiting: None Postoperative hydration reviewed and adequate. Pain: 
Pain Scale 1: Numeric (0 - 10) (08/29/18 0930) Pain Intensity 1: 2 (08/29/18 0930) Managed Neurological Status:  
Neuro (WDL): Within Defined Limits (08/29/18 0759) At baseline Mental Status and Level of Consciousness: Arousable Pulmonary Status:  
O2 Device: Room air (08/29/18 0759) Adequate oxygenation and airway patent Complications related to anesthesia: None Post-anesthesia assessment completed. No concerns Signed By: Angelica Schmidt MD   
 August 29, 2018

## 2018-08-29 NOTE — H&P
EDC:2018 EGA: 39 weeks, 5 days Primary Provider:  Pito George MD 
 
 
History of Present Illness: 
42 y/o  at 38w11d with h/o previous section for nrfs/apa. subsequent successful . reports increased ucs, and vaginal pressure. denies vb, or leaking. good FM. presents today for pit augmentation 
group b streptococcus pos Pnc with Dr. Gibran De La Cruz High risk, AMA multigravida_cfdna NEG XX (ICD-V23.82) (DAT41-D29.820) GBS positive RX in labor (IAL-226.03) (JYS84-I49.03) Prev LTCS h/o , desires  consented (VWD-722.46) (IGI58-P15.766) Sickle cell trait FOB NEG_urine culture q trimester: (QVJ-632.99) (UBO05-K72.2xx0) Uterine anomaly pregnant:  ?arcuate uterus (ICD-654.03) (XUJ67-D63.03) DM increased risk early GS wnl (ICD-796.5) (AZL73-V00.8) Anemia on FE pregnant (BCZ-208.28) (BOV47-R78.01 Patient's Prenatal Care with Doctor of Record Pito George MD Notable For - 
 
*Note: IOL/  6am @[facility] Dr. Gibran De La Cruz w/ PAT  10am 
High risk, AMA multigravida_cfdna NEG XX 
GBS positive RX in labor Prev LTCS h/o , desires  consented Sickle cell trait FOB NEG_urine culture q trimester: 
Uterine anomaly pregnant:  ?arcuate uterus DM increased risk early GS wnl Anemia on FE pregnant Asthma pregnant Impression & Recommendations: 
 
Problem # 1:  Prev LTCS h/o , desires  consented (KHY-404.35) (PXI97-V18.211)  at term with h/o previous section and subsequent  
desires a trial of labor, pit indxn 
group b streptococcus po _tx with abx 
reasuring fetal surveillance 
 
epidural/arom as needed after 2 doses of abx Problem # 2:  GBS positive RX in labor (WQG-079.18) (FRL92-A36.05) 
tx with PCN Problem # 3:  High risk, AMA multigravida_cfdna NEG XX (ICD-V23.82) (KIW07-Y88.523) 
nl chromosomes Problem # 4:  Uterine anomaly pregnant:  ?arcuate uterus (ICD-654.03) (UGL23-A38.03) Problem # 5:  Anemia on FE pregnant (XQX-683.80) (GIO29-L99.013) Problem # 6:  Asthma pregnant (XSR-063.91) (DJI20-F86.016) Other Orders: 
Inpatient Admission - Medium (CPT-AdmitF) Past Pregnancy History :  4 Term Births:  3 Premature Births: 0 Living Children: 3 Para:   3 Mult. Births:  0 Prev : 1 Prev.  attempt? success x1 Aborta:  0 Elect. Ab:  0 Spont. Ab:  0 Ectopics:  0 Pregnancy # 1 Delivery date:   2000 Weeks Gestation: 40 
    labor:   no 
   Delivery type:    Anesthesia type:   epidural 
   Delivery location:   [facility] Infant Sex:  Male Birth weight:  7lb 3oz Name:  Edwin Rutledge Comments:  Deliv at [facility], denies complications Pregnancy # 2 Delivery date:   2008 Weeks Gestation: 37 
    labor:   no 
   Delivery type:   LTCS Anesthesia type:   epidural 
   Delivery location:   [facility] Infant Sex:  Female Birth weight:  Adriana Quiñonesi Name:  Aide Nguyen Comments:  Del by Dr. Chong Dolan, apgars 9/9, Indications: NRFS and APA. Oligo at term. Pregnancy # 3 Delivery date:   2016 Weeks Gestation: 44  labor:   no 
   Delivery type:    Anesthesia type:   epidural 
   Delivery location:   [facility] Infant Sex:  Male Birth weight:  Hope Siad Name:  Erum Sarkar Comments:  Angela Snider , desires circ Past Medical History: 
   Reviewed history from 2018 and no changes required: 
      mirena iud insertion  - removed 10/2012 Allergies/ Asthma 
      vit D def  
      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 u/s ? PCOS Past Surgical History: 
   Reviewed history from 2016 and no changes required: 
      Vaginal Delivery x 1 LTCS (2008) Dr. 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  
      [id number], , Male, [place] Family History Summary:  
   Reviewed history Last on 2018 and no changes required:2018 General Comments - FH: 
Family history transferred to [facility] CHILDREN Evert briceño Social History: 
   Reviewed history from 2016 and no changes required: 
       
      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 
Drug use:  no 
HIV high-risk behavior:  no 
Caffeine use:  1 drinks per day Previous Alcohol Use: Signed On 2017 Alcohol use:  no 
Exercise:  no 
Seatbelt use:  100 % Sun Exposure:  occasionally Review of Systems See HPI Except as noted in the HPI, the review of systems is negative for General and . Allergies LATEX (DISPOSABLE GLOVES) (Moderate) Medications Removed from Medication List 
 
 
 
[facility] for Follow-up Visit Estimated weeks of 
      gestation:  39 5/7 Fetal position:  vertex Cx Dilation:  3cm Cx Effacement: 50% Cx Station:  -3 Physical Exam  
 
General  
        General appearance:  no acute distress Head Inspection:   normal 
 
Eyes External:   EOM intact Extremeties Extremeties:  1+ edema bilaterally Psych Orientation:  oriented to time, place, and person Skin Inspection:  no rashes, suspicious lesions, or ulcerations Abdomen Abdomen:  gravid Dilation: : 3cm Effacement:  50% Station:  -3 Presentation:  vertex Membranes:  intact Pelvis:  adequate Consistency:  soft Position: posterior Impression & Recommendations: 
 
Problem # 1:  Prev LTCS h/o , desires  consented (IEU-601.89) (EEV80-O71.211)  at term with h/o previous section and subsequent  
desires a trial of labor, pit indxn 
group b streptococcus po _tx with abx 
reasuring fetal surveillance 
 
epidural/arom as needed after 2 doses of abx Problem # 2:  GBS positive RX in labor ([facility]-930.84) (NHK27-S53.79) 
tx with PCN Problem # 3:  High risk, AMA multigravida_cfdna NEG XX (ICD-V23.82) (ESK00-D54.523) 
nl chromosomes Problem # 4:  Uterine anomaly pregnant:  ?arcuate uterus (ICD-654.03) (DCN18-I60.03) Problem # 5:  Anemia on FE pregnant (RYK-925.62) (ATO40-S98.013) Problem # 6:  Asthma pregnant (WKP-884.45) (FNL58-H00.533) Other Orders: 
Inpatient Admission - Medium (CPT-AdmitF) Medications (at conclusion of this visit) 07/10/2018 SM IRON SLOW RELEASE 160 (50 FE) MG ORAL TABLET EXTENDED RELEASE (FERROUS SULFATE DRIED)  
2018 FLONASE 50 MCG/ACT NASAL SUSPENSION (FLUTICASONE PROPIONATE) 1 spray of 50 ug in each nostril 1-2x/daily 2018 ZYRTEC ALLERGY 10 MG ORAL CAPSULE (CETIRIZINE HCL) 1 tab po daily 2018 PREFERAOB +DHA 28-6-1 & 200 MG ORAL (PRENAT TNVGTA-DUXNUSX-JH-DHA) 1 PO daily LABORATORY DATA TEST DATE RESULT Group B Strep culture 2018 Positive                                   (Group B Strep Culture Result Field) Blood Type 2018 O                                             (Blood Type Result Field) Rh 2018 Positive                                   (Rh Result Field) Rhogam Inj Given 2016 * Tdap Vaccine Given 2018 Vacc. [address] Antibody Screen 2018 Negative Rubella [facility] Reference Ranges On or After 3/10/14             
    <0.90              Non-immune 0.90 - 0.99     Equivocal 
    >0.99              Immune [address] Before 3/10/14    
      <5                 Non-immune 5 - 9               Equivocal     
      >9                 Immune [facility] < Or = 0.90       Negative        
    0.91-1.09          Equivocal       
    > Or = 1.10       Positive   02/09/2018 
 
 1.59 
  
TPA (T Pallidum Antibodies) 06/12/2018 Negative Serology (RPR) 05/11/2016 * HBsAg 02/09/2018 Negative HIV 02/09/2018 Non Reactive Hemoglobin 06/12/2018 10.6 Hematocrit 06/12/2018 32.3 Platelets 46/76/8391 194 X10E3/UL  
TSH 05/25/2017 1.560 Urine Culture 06/12/2018 Negative GC DNA Probe 02/09/2018 Negative Chlamydia DNA 02/09/2018 Negative PAP 05/25/2017 NIL Flu Vaccine Given 04/16/2018 declined HGBA1C 02/09/2018 5.1 HGB Electro 01/21/2008 Positive T4, Free 05/11/2016 * BG Fasting 05/11/2016 * GTT 1H 50G 06/12/2018 85 GTT 1H 100G 05/11/2016 * GTT 2H 100G 05/11/2016 * GTT 3H 100G 05/11/2016 * Glucose Plasma 05/11/2016 * CF Accept or Decline 03/20/2018 declined CF Screen Result 03/20/2018 Declined Nuchal Trans 02/09/2018 Declined AFP Only 03/20/2018 *Screen Negative* Tetra AFP Serum 05/11/2016 * CVS 05/11/2016 * AFP Amniotic 05/11/2016 * Amnio Karyo 05/11/2016 * FISH 05/11/2016 * GC Culture 05/11/2016 * Chlamydia Cult 05/11/2016 * Ureaplasma Mycoplasma WBC 02/09/2018 11.9 X10E3/UL  
RBC 02/09/2018 4.21 X10E6/UL  
MCV 02/09/2018 [address] 02/09/2018 27.1 MCHC RBC 02/09/2018 32.1 ULTRASOUND DATA TEST DATE RESULT Estimated Fetal Weight 07/31/2018 4912^6153 g&grams Weight % 07/31/2018 77^77% %&percent AFUA 07/31/2018 48.55^09. 8 cm&centimeters BPP 07/31/2018 8^8 [n/a]&Not applicable Cervical Length (mm) Electronically signed by Johnnie Kidd MD on 08/29/2018 ________________________________________________________________________

## 2018-08-29 NOTE — PROGRESS NOTES
Labor Progress Note Patient seen, fetal heart rate and contraction pattern evaluated, patient examined. Patient Vitals for the past 1 hrs: 
 BP Temp Pulse 18 1651 92/52 98.1 °F (36.7 °C) 71 Physical Exam: 
Cervical Exam:  5/80 %/-3/  
Membranes:  Artificial Rupture of Membranes; Amniotic Fluid: medium amount of clear fluid Uterine Activity: every 2-3 mins Fetal Heart Rate: Reactive Assessment/Plan: 
Term Multip with h/o previous section for nrfs/apa. H/o successful  Desires a trial of labor. Understands risks. Reassuring fetal status, Labor  Progressing normally, Continue plan for vaginal delivery

## 2018-08-29 NOTE — ANESTHESIA PROCEDURE NOTES
Epidural Block Performed by: Leni Calix Authorized by: Leni Calix Pre-Procedure Indication: labor epidural   
Preanesthetic Checklist: patient identified, risks and benefits discussed, anesthesia consent, site marked, patient being monitored, timeout performed and anesthesia consent Epidural:  
Patient position:  Seated Prep region:  Lumbar Prep: DuraPrep Location:  L3-4 Needle and Epidural Catheter:  
Needle Type:  Tuohy Needle Gauge:  17 G Injection Technique:  Loss of resistance using air Attempts:  2 Catheter Size:  19 G Catheter at Skin Depth (cm):  10 Depth in Epidural Space (cm):  6 Events: no blood with aspiration, no cerebrospinal fluid with aspiration, no paresthesia and negative aspiration test   
Test Dose:  Lidocaine 1.5% w/ epi and negative Assessment:  
Catheter Secured:  Tegaderm and tape Insertion:  Uncomplicated Patient tolerance:  Patient tolerated the procedure well with no immediate complications

## 2018-08-29 NOTE — IP AVS SNAPSHOT
[address] 
[phone] Patient: Manpreet Saucedo MRN: [id number] JPO: About your hospitalization You were admitted on:  2018 You last received care in the:  [address] [facility] You were discharged on:  2018 Why you were hospitalized Your primary diagnosis was:  Not on File Your diagnoses also included:  Previous  Section, Term Pregnancy, Arcuate Uterus Follow-up Information Follow up With Details Comments Contact Info Manas Manzano MD Schedule an appointment as soon as possible for a visit in 6 weeks  [address] [place] [address] [phone] Discharge Orders None A check rex indicates which time of day the medication should be taken. My Medications START taking these medications Instructions Each Dose to Equal  
 Morning Noon Evening Bedtime  
 ibuprofen 600 mg tablet Commonly known as:  MOTRIN Your last dose was: Your next dose is: Take 1 Tab by mouth every six (6) hours as needed for Pain. 600 mg CONTINUE taking these medications Instructions Each Dose to Equal  
 Morning Noon Evening Bedtime PNV No12-Iron-FA-DSS-OM-3 29 mg iron-1 mg -50 mg Cpkd Your last dose was: Your next dose is: Take  by mouth. STOP taking these medications FLONASE ALLERGY RELIEF 50 mcg/actuation nasal spray Generic drug:  fluticasone ZyrTEC 10 mg Cap Generic drug:  Cetirizine Where to Get Your Medications Information on where to get these meds will be given to you by the nurse or doctor. ! Ask your nurse or doctor about these medications  
  ibuprofen 600 mg tablet Discharge Instructions After Your Delivery (the Postpartum Period): Care Instructions Your Care Instructions Congratulations on the birth of your baby. Like pregnancy, the  period can be a time of excitement, leonel, and exhaustion. You may look at your wondrous little baby and feel happy. You may also be overwhelmed by your new sleep hours and new responsibilities. At first, babies often sleep during the days and are awake at night. They do not have a pattern or routine. They may make sudden gasps, jerk themselves awake, or look like they have crossed eyes. These are all normal, and they may even make you smile. In these first weeks after delivery, try to take good care of yourself. It may take 4 to 6 weeks to feel like yourself again, and possibly longer if you had a  birth. You will likely feel very tired for several weeks. Your days will be full of ups and downs, but lots of leonel as well. Follow-up care is a key part of your treatment and safety. Be sure to make and go to all appointments, and call your doctor if you are having problems. It's also a good idea to know your test results and keep a list of the medicines you take. How can you care for yourself at home? Take care of your body after delivery · Use pads instead of tampons for the bloody flow that may last as long as 2 weeks. · Ease cramps with ibuprofen (Advil, Motrin). · Ease soreness of hemorrhoids and the area between your vagina and rectum with ice compresses or witch hazel pads. · Ease constipation by drinking lots of fluid and eating high-fiber foods. Ask your doctor about over-the-counter stool softeners. · Cleanse yourself with a gentle squeeze of warm water from a bottle instead of wiping with toilet paper. · Take a sitz bath in warm water several times a day. · Wear a good nursing bra. Ease sore and swollen breasts with warm, wet washcloths. · If you are not breastfeeding, use ice rather than heat for breast soreness. · Your period may not start for several months if you are breastfeeding. You may bleed more, and longer at first, than you did before you got pregnant. · Wait until you are healed (about 4 to 6 weeks) before you have sexual intercourse. Your doctor will tell you when it is okay to have sex. · Try not to travel with your baby for 5 or 6 weeks. If you take a long car trip, make frequent stops to walk around and stretch. Avoid exhaustion · Rest every day. Try to nap when your baby naps. · Ask another adult to be with you for a few days after delivery. · Plan for  if you have other children. · Stay flexible so you can eat at odd hours and sleep when you need to. Both you and your baby are making new schedules. · Plan small trips to get out of the house. Change can make you feel less tired. · Ask for help with housework, cooking, and shopping. Remind yourself that your job is to care for your baby. Know about help for postpartum depression · \"Baby blues\" are common for the first 1 to 2 weeks after birth. You may cry or feel sad or irritable for no reason. · Rest whenever you can. Being tired makes it harder to handle your emotions. · Go for walks with your baby. · Talk to your partner, friends, and family about your feelings. · If your symptoms last for more than a few weeks, or if you feel very depressed, ask your doctor for help. · Postpartum depression can be treated. Support groups and counseling can help. Sometimes medicine can also help. Stay healthy · Eat healthy foods so you have more energy, make good breast milk, and lose extra baby pounds. · If you breastfeed, avoid alcohol and drugs. If you quit smoking during pregnancy, try to stay smoke-free. · Start daily exercise after 4 to 6 weeks, but rest when you feel tired. · Learn exercises to tone your belly. Do Kegel exercises to regain strength in your pelvic muscles. You can do these exercises while you stand or sit. ¨ Squeeze the same muscles you would use to stop your urine. Your belly and thighs should not move. ¨ Hold the squeeze for 3 seconds, and then relax for 3 seconds. ¨ Start with 3 seconds. Then add 1 second each week until you are able to squeeze for 10 seconds. ¨ Repeat the exercise 10 to 15 times for each session. Do three or more sessions each day. · Find a class for new mothers and new babies that has an exercise time. · If you had a  birth, give yourself a bit more time before you exercise, and be careful. When should you call for help? Call 911 anytime you think you may need emergency care. For example, call if: 
  · You passed out (lost consciousness).  
 Call your doctor now or seek immediate medical care if: 
  · You have severe vaginal bleeding. This means you are passing blood clots and soaking through a pad each hour for 2 or more hours.  
  · You are dizzy or lightheaded, or you feel like you may faint.  
  · You have a fever.  
  · You have new belly pain, or your pain gets worse.  
 Watch closely for changes in your health, and be sure to contact your doctor if: 
  · Your vaginal bleeding seems to be getting heavier.  
  · You have new or worse vaginal discharge.  
  · You feel sad, anxious, or hopeless for more than a few days.  
  · You do not get better as expected. Where can you learn more? Go to http://aleksandra-jordi.info/. Enter A461 in the search box to learn more about \"After Your Delivery (the Postpartum Period): Care Instructions. \" Current as of: 2017 Content Version: 11.7 © 3980-1644 [employer]. Care instructions adapted under license by [employer] (which disclaims liability or warranty for this information). If you have questions about a medical condition or this instruction, always ask your healthcare professional. [address] any warranty or liability for your use of this information. [employer] [employer] Announcement We are excited to announce that we are making your provider's discharge notes available to you in [employer]. You will see these notes when they are completed and signed by the physician that discharged you from your recent hospital stay. If you have any questions or concerns about any information you see in [employer], please call the Health Information Department where you were seen or reach out to your Primary Care Provider for more information about your plan of care. Introducing \[facility]\"" & HEALTH SERVICES! [employer] introduces [employer] patient portal. Now you can access parts of your medical record, email your doctor's office, and request medication refills online. 1. In your internet browser, go to [email or web address]. [facility]/[employer] 2. Click on the First Time User? Click Here link in the Sign In box. You will see the New Member Sign Up page. 3. Enter your [employer] Access Code exactly as it appears below. You will not need to use this code after youve completed the sign-up process. If you do not sign up before the expiration date, you must request a new code. · [employer] Access Code: [email or web address] Expires: 11/12/2018  7:42 PM 
 
4. Enter the last four digits of your Social Security Number (xxxx) and Date of Birth (mm/dd/yyyy) as indicated and click Submit. You will be taken to the next sign-up page. 5. Create a [employer] ID. This will be your [employer] login ID and cannot be changed, so think of one that is secure and easy to remember. 6. Create a [employer] password. You can change your password at any time. 7. Enter your Password Reset Question and Answer. This can be used at a later time if you forget your password. 8. Enter your e-mail address. You will receive e-mail notification when new information is available in [address] 9. Click Sign Up. You can now view and download portions of your medical record. 10. Click the Download Summary menu link to download a portable copy of your medical information. If you have questions, please visit the Frequently Asked Questions section of the [employer] website. Remember, smsPREP is NOT to be used for urgent needs. For medical emergencies, dial 911. Now available from your iPhone and Android! Introducing Steve Laura As a [employer] patient, I wanted to make you aware of our electronic visit tool called Steve Laura. [employer] 24/7 allows you to connect within minutes with a medical provider 24 hours a day, seven days a week via a mobile device or tablet or logging into a secure website from your computer. You can access Steve Laura from anywhere in the [place]. A virtual visit might be right for you when you have a simple condition and feel like you just dont want to get out of bed, or cant get away from work for an appointment, when your regular [employer] provider is not available (evenings, weekends or holidays), or when youre out of town and need minor care. Electronic visits cost only $49 and if the [employer] 24/7 provider determines a prescription is needed to treat your condition, one can be electronically transmitted to a nearby pharmacy*. Please take a moment to enroll today if you have not already done so. The enrollment process is free and takes just a few minutes. To enroll, please download the [employer] 24/7 jeanie to your tablet or phone, or visit [email or web address] to enroll on your computer. And, as an [address] [place] [address] patient with a Angella [employer] account, the results of your visits will be scanned into your electronic medical record and your primary care provider will be able to view the scanned results. We urge you to continue to see your regular [facility] provider for your ongoing medical care.   And while your primary care provider may not be the one available when you seek a Steve Александрeloisa virtual visit, the peace of mind you get from getting a real diagnosis real time can be priceless. For more information on Steve Fountaintaofin, view our Frequently Asked Questions (FAQs) at [email or web address]. Sincerely, 
 
Cristina Krishna MD 
Chief Medical Officer Aniceto Carranza *:  certain medications cannot be prescribed via Steve Laura Providers Seen During Your Hospitalization Provider Specialty Primary office phone Vamshi Curtis MD Obstetrics & Gynecology [phone] Your Primary Care Physician (PCP) Primary Care Physician Office Phone Office Fax Viktor Bookerh [phone] You are allergic to the following Allergen Reactions Latex Rash Itching Recent Documentation Height Weight Breastfeeding? BMI OB Status Smoking Status 1.829 m 122.9 kg Unknown 36.75 kg/m2 Recent pregnancy Never Smoker Emergency Contacts Name Discharge Info Relation Home Work Mobile FelixJuany DISCHARGE CAREGIVER [3] Parent [1] [phone] Zacarias Kaur DISCHARGE CAREGIVER [3] Other Relative [6] [phone] Patient Belongings The following personal items are in your possession at time of discharge: 
  Dental Appliances: None  Visual Aid: Glasses      Home Medications: None   Jewelry: None  Clothing: At bedside, With patient    Other Valuables: None, Purse, Cell Phone  Personal Items Sent to Safe: NONE Please provide this summary of care documentation to your next provider. Signatures-by signing, you are acknowledging that this After Visit Summary has been reviewed with you and you have received a copy. Patient Signature:  ____________________________________________________________ Date:  ____________________________________________________________  
  
Richelle Abernathy  Provider Signature: ____________________________________________________________ Date:  ____________________________________________________________

## 2018-08-29 NOTE — L&D DELIVERY NOTE
Delivery Summary    Patient: Jame Garza MRN: [id number]  SSN: [id number]    YOB: 1980  Age: 41 y.o. Sex: female        Labor Events:    Labor: No    Rupture Date: 2018    Rupture Time: 2:15 PM    Rupture Type: AROM    Amniotic Fluid Volume: Moderate     Amniotic Fluid Description:  Amniotic Fluid Odor: Clear    None     Induction: Oxytocin         Induction Date:        Induction Time:       Indications for Induction: Elective     Augmentation: AROM    Augmentation Date:      Augmentation Time:      Indications for Augmentation:      Cervical Ripening:            Rupture Identifier:       Labor complications: Additional complications:           Delivery Events:  Episiotomy: None    Indications for Episiotomy:      Laceration(s): None       Repaired:       Number of Repair Packets:      Suture Type and Size:          Estimated Blood Loss (ml): 400        Information for the patient's :  Ajit Krause [[id number]]     Delivery Summary - Baby    Delivery Date: 2018   Delivery Time: 6:00 PM   Delivery Type: Vaginal, Spontaneous Delivery  Sex:  female  Gestational Age: 39w5d  Delivery Clinician:  Tone Hinton  Living?: Living   Delivery Location: L&D 10           APGARS  One minute Five minutes Ten minutes   Skin Color: 1    1       Heart Rate: 2   2         Reflex Irritability: 2   2         Muscle Tone: 2   2       Respiration: 2   2         Total: 9   9           Presentation: Vertex  Position:        Resuscitation Method:        Meconium Stained:      Cord Information: 3 Vessels   Complications: None  Cord Blood Sent?:  Yes    Blood Gases Sent?:  No    Placenta:  Date/Time:    Removal:        Appearance:       [place] Measurements:  Birth Weight:      Birth Length:     Head Circumference:       Chest Circumference:      Abdominal Girth:       Other Providers:   LEATHA CORDOVA;LIZZ NARANJO E Obstetrician;Primary Nurse;Primary  Nurse Cord Blood Results:  Information for the patient's :  [facility] [[id number]]   No results found for: ABORH, PCTABR, PCTDIG, BILI, ABORHEXT, ABORH    Information for the patient's :  [facility] [[id number]]   No results found for: APH, APCO2, APO2, AHCO3, ABEC, ABDC, O2ST, SITE, RSCOM, PHI, [place], PO2I, HCO3I, SO2I, IBD     Information for the patient's :  [facility] [[id number]]   No results found for: EPHV, PCO2V, PO2V, HCO3V, O2STV, EBDV    Mild uterine atony controlled with IV pit, bimanual uterine massage and 800 ugs rectal cytotec

## 2018-08-29 NOTE — PROGRESS NOTES
0740-Bedside and Verbal shift change report given to GISELLE Ramos RN  (oncoming nurse) by COCO Melo RN  (offgoing nurse). Report included the following information SBAR, Kardex, MAR and Accordion.

## 2018-08-30 PROCEDURE — 65410000002 HC RM PRIVATE OB

## 2018-08-30 PROCEDURE — 74011250637 HC RX REV CODE- 250/637: Performed by: OBSTETRICS & GYNECOLOGY

## 2018-08-30 PROCEDURE — 74011250637 HC RX REV CODE- 250/637: Performed by: SPECIALIST

## 2018-08-30 RX ORDER — ACETAMINOPHEN 325 MG/1
650 TABLET ORAL
Status: DISCONTINUED | OUTPATIENT
Start: 2018-08-30 | End: 2018-08-31 | Stop reason: HOSPADM

## 2018-08-30 RX ADMIN — ACETAMINOPHEN 650 MG: 325 TABLET ORAL at 05:54

## 2018-08-30 RX ADMIN — ACETAMINOPHEN 650 MG: 325 TABLET ORAL at 23:19

## 2018-08-30 RX ADMIN — IBUPROFEN 800 MG: 400 TABLET ORAL at 09:34

## 2018-08-30 RX ADMIN — IBUPROFEN 800 MG: 400 TABLET ORAL at 18:24

## 2018-08-30 NOTE — ROUTINE PROCESS
Bedside and Verbal shift change report given to SHERRIE Metzger Cancer RN (oncoming nurse) by GUSTAVO Guo RN (offgoing nurse). Report included the following information SBAR, Kardex, Procedure Summary, Intake/Output, MAR, Recent Results and Med Rec Status

## 2018-08-30 NOTE — PROGRESS NOTES
@2792 Bedside shift change report given to COCO Ambriz RN (oncoming nurse) by Keely Briceno. Inga Ramos RN (offgoing nurse). Report included the following information SBAR, Kardex, Intake/Output, MAR and Recent Results. @2030 TRANSFER - OUT REPORT: 
 
Verbal report given to  RAYRAY Reza (name) on Rosmery Pole  being transferred to MIU (unit) for routine progression of care Report consisted of patients Situation, Background, Assessment and  
Recommendations(SBAR). Information from the following report(s) SBAR, Kardex, Intake/Output, MAR and Recent Results was reviewed with the receiving nurse. Lines:  
Peripheral IV 08/29/18 Left Forearm (Active) Site Assessment Clean, dry, & intact 8/29/2018  6:30 AM  
Phlebitis Assessment 0 8/29/2018  6:30 AM  
Infiltration Assessment 0 8/29/2018  6:30 AM  
Dressing Status Clean, dry, & intact 8/29/2018  6:30 AM  
Dressing Type Tape;Transparent 8/29/2018  6:30 AM  
Hub Color/Line Status Green 8/29/2018  6:30 AM  
  
 
Opportunity for questions and clarification was provided. Patient transported with: 
 Registered Nurse

## 2018-08-30 NOTE — ADT AUTH CERT NOTES
Patient Demographics     
  Patient Name [address] Sex  Address Phone    
  Phil Meyer [phone] Female 1980 [address] 
[address] [facility] [address] [phone] (Mobile) *Preferred*    
   
  CSN:    
  [id number]    
   
  Admit Date: Admit Time Room Bed    
  Aug 29, 2018  5:54  [55270] 01 [[id number]]    
   
  Attending Providers     
  Provider Pager From To    
  Manas Manzano MD  18 Delivery Date: 2018 Delivery Time: 6:00 PM  
Delivery Type: Vaginal, Spontaneous Delivery Sex:  female Gestational Age: 38w11d 
  
 Measurements: 
Birth Weight: 3.765 kg   
Birth Length: 21\"   
  
  
Delivery Clinician:  Manas Manzano Living?:  
Delivery Location: L&D

## 2018-08-30 NOTE — ROUTINE PROCESS
TRANSFER - IN REPORT: 
 
Verbal report received from [address] (name) on Anne Chance  being received from L&D(unit) for routine progression of care Report consisted of patients Situation, Background, Assessment and  
Recommendations(SBAR). Information from the following report(s) SBAR was reviewed with the receiving nurse. Opportunity for questions and clarification was provided. Assessment completed upon patients arrival to unit and care assumed.

## 2018-08-30 NOTE — PROGRESS NOTES
Post-Partum Day Number 1 Progress Note Unk Overall Assessment: Doing well, post partum day 1 Plan: 1. Continue routine postpartum and perineal care as well as maternal education. 2. N/A Information for the patient's :  Ajit Krause [[id number]] Vaginal, Spontaneous Delivery Patient doing well without significant complaint. Voiding without difficulty, normal lochia. Vitals: 
Visit Vitals  /72 (BP 1 Location: Right arm, BP Patient Position: At rest)  Pulse 76  Temp 98 °F (36.7 °C)  Resp 16  
 Ht 6' (1.829 m)  Wt 122.9 kg (271 lb)  SpO2 100%  Breastfeeding Unknown  BMI 36.75 kg/m2 Temp (24hrs), Av.2 °F (36.8 °C), Min:97.8 °F (36.6 °C), Max:99.2 °F (37.3 °C) Exam:   Patient without distress. Abdomen soft, fundus firm, nontender Perineum with normal lochia noted. Lower extremities are negative for swelling, cords or tenderness. Labs:  
 
Lab Results Component Value Date/Time WBC 10.1 2018 10:29 AM  
 WBC 9.9 05/10/2016 10:02 AM  
 WBC 7.9 2012 11:34 AM  
 HGB 10.9 (L) 2018 10:29 AM  
 HGB 12.0 05/10/2016 10:02 AM  
 HGB 13.6 2012 11:34 AM  
 HCT 33.2 (L) 2018 10:29 AM  
 HCT 36.7 05/10/2016 10:02 AM  
 HCT 41.2 2012 11:34 AM  
 PLATELET 890 (L)  10:29 AM  
 PLATELET 234 (L)  10:02 AM  
 PLATELET 738  11:34 AM  
 
 
No results found for this or any previous visit (from the past 24 hour(s)).

## 2018-08-30 NOTE — LACTATION NOTE
This note was copied from a baby's chart. Initial Lactation Consultation - Baby born vaginally last evening to a  mom at 44 5/7 weeks gestation. Mom states she nursed her other children and felt she had an adequate milk supply with each child. Mom states the baby has been latching and nursing well. Frequent feeding during the brief behavioral phase preceeding milk transition is called cluster feeding. Typical  behavior: baby becomes vigorous at the breast and wants to feed frequently- every 1-2 hours for several feedings. Emptying of the breast twice produces double in subsequent feedings. This is the normal process by which the baby demands his/her supply. This type of frequent feeding is the stimulation which causes lactogenesis II (milk coming in). Mom states the baby has been feeding frequently this afternoon. Feeding Plan: Mother will keep baby skin to skin as often as possible, feed on demand, respond to feeding cues, obtain latch, listen for audible swallowing, be aware of signs of oxytocin release/ cramping,thrist,sleepyness while breastfeeding, offer both breasts,and will not limit feedings.  Mom will completely finish one breast and then offer the second breast.

## 2018-08-31 VITALS
SYSTOLIC BLOOD PRESSURE: 126 MMHG | WEIGHT: 271 LBS | RESPIRATION RATE: 16 BRPM | BODY MASS INDEX: 36.7 KG/M2 | OXYGEN SATURATION: 100 % | TEMPERATURE: 97.7 F | HEART RATE: 70 BPM | HEIGHT: 72 IN | DIASTOLIC BLOOD PRESSURE: 76 MMHG

## 2018-08-31 PROCEDURE — 74011250637 HC RX REV CODE- 250/637: Performed by: OBSTETRICS & GYNECOLOGY

## 2018-08-31 RX ORDER — IBUPROFEN 600 MG/1
600 TABLET ORAL
Qty: 40 TAB | Refills: 0 | Status: SHIPPED | OUTPATIENT
Start: 2018-08-31 | End: 2019-03-21

## 2018-08-31 RX ADMIN — IBUPROFEN 800 MG: 400 TABLET ORAL at 03:24

## 2018-08-31 RX ADMIN — IBUPROFEN 800 MG: 400 TABLET ORAL at 11:25

## 2018-08-31 NOTE — DISCHARGE SUMMARY
Obstetrical Discharge Summary     Name: Briseyda Lang MRN: [id number]  SSN: [id number]    YOB: 1980  Age: 41 y.o. Sex: female      Admit Date: 2018    Discharge Date: 2018     Admitting Physician: Jael Sheppard MD     Attending Physician:  Jael Sheppard MD     Admission Diagnoses: Previous  section  Term pregnancy    Discharge Diagnoses:   Information for the patient's :  [facility] [[id number]]   Delivery of a 3.765 kg female infant via Vaginal, Spontaneous Delivery on 2018 at 6:00 PM  by . Apgars were 9 and 9. Additional Diagnoses:   Hospital Problems  Date Reviewed: 2018          Codes Class Noted POA    Previous  section ICD-10-CM: Z98.891  ICD-9-CM: V45.89  2018 Unknown        Term pregnancy ICD-10-CM: Z34.80  ICD-9-CM: V22.1  2018 Unknown        Arcuate uterus ICD-10-CM: Q51.810  ICD-9-CM: 752.36  2018 Unknown             Lab Results   Component Value Date/Time    Rubella, External immune 2018    GrBStrep, External POSITIVE 2018       Hospital Course: Normal hospital course following the delivery. Disposition at Discharge: Home or self care    Discharged Condition: Stable    Patient Instructions:   Current Discharge Medication List      START taking these medications    Details   ibuprofen (MOTRIN) 600 mg tablet Take 1 Tab by mouth every six (6) hours as needed for Pain. Qty: 40 Tab, Refills: 0         CONTINUE these medications which have NOT CHANGED    Details   PNV No12-Iron-FA-DSS-OM-3 29 mg iron-1 mg -50 mg CPKD Take  by mouth. STOP taking these medications       Cetirizine (ZYRTEC) 10 mg cap Comments:   Reason for Stopping:         fluticasone (FLONASE ALLERGY RELIEF) 50 mcg/actuation nasal spray Comments:   Reason for Stopping:               Reference my discharge instructions.     Follow-up Appointments   Procedures    FOLLOW UP VISIT Appointment in: 6 Weeks     Standing Status: Standing     Number of Occurrences:   1     Order Specific Question:   Appointment in     Answer:   6 Weeks        Signed By:  Lorraine Hameed MD     August 31, 2018

## 2018-08-31 NOTE — PROGRESS NOTES
Post-Partum Day Number 2 Progress Note Issac Tom Assessment: Doing well, post partum day 2 Plan: 1. Discharge home today 2. Follow up in office in 6 weeks with Mike Figueroa MD 
3. Post partum activity advised, diet as tolerated 4. Discharge Medications: ibuprofen, percocet and medications prior to admission Information for the patient's :  Pauline Valles [[id number]] Vaginal, Spontaneous Delivery Subjective: 
Patient doing well without significant complaint. Voiding without difficulty, normal lochia. Ready to go home. Vitals: 
Visit Vitals  /78 (BP 1 Location: Right arm, BP Patient Position: At rest)  Pulse 72  Temp 97.7 °F (36.5 °C)  Resp 16  
 Ht 6' (1.829 m)  Wt 122.9 kg (271 lb)  SpO2 100%  Breastfeeding Unknown  BMI 36.75 kg/m2 Temp (24hrs), Av.9 °F (36.6 °C), Min:97.7 °F (36.5 °C), Max:98.2 °F (36.8 °C) Exam:    
    Patient without distress. Abdomen soft, fundus firm, nontender Lower extremities are negative for swelling, cords or tenderness. Labs:  
 
Lab Results Component Value Date/Time WBC 10.1 2018 10:29 AM  
 WBC 9.9 05/10/2016 10:02 AM  
 WBC 7.9 2012 11:34 AM  
 HGB 10.9 (L) 2018 10:29 AM  
 HGB 12.0 05/10/2016 10:02 AM  
 HGB 13.6 2012 11:34 AM  
 HCT 33.2 (L) 2018 10:29 AM  
 HCT 36.7 05/10/2016 10:02 AM  
 HCT 41.2 2012 11:34 AM  
 PLATELET 453 (L)  10:29 AM  
 PLATELET 461 (L)  10:02 AM  
 PLATELET 821  11:34 AM  
 
 
No results found for this or any previous visit (from the past 24 hour(s)).

## 2018-08-31 NOTE — ROUTINE PROCESS
Bedside and Verbal shift change report given to Paty Kelly RN (oncoming nurse) by SHERRIE Gilmore RN (offgoing nurse). Report included the following information SBAR.

## 2018-08-31 NOTE — LACTATION NOTE
This note was copied from a baby's chart. Not seen at breast, mother declines [facility] consult, expresses confidence in ability to breastfeed independently. Mother states that she has no further questions for Lactation Consultant before discharge. Mother has [facility] phone number and agrees to call with questions or seek help from her provider if needed.

## 2018-08-31 NOTE — DISCHARGE INSTRUCTIONS
After Your Delivery (the Postpartum Period): Care Instructions  Your Care Instructions    Congratulations on the birth of your baby. Like pregnancy, the  period can be a time of excitement, leonel, and exhaustion. You may look at your wondrous little baby and feel happy. You may also be overwhelmed by your new sleep hours and new responsibilities. At first, babies often sleep during the days and are awake at night. They do not have a pattern or routine. They may make sudden gasps, jerk themselves awake, or look like they have crossed eyes. These are all normal, and they may even make you smile. In these first weeks after delivery, try to take good care of yourself. It may take 4 to 6 weeks to feel like yourself again, and possibly longer if you had a  birth. You will likely feel very tired for several weeks. Your days will be full of ups and downs, but lots of leonel as well. Follow-up care is a key part of your treatment and safety. Be sure to make and go to all appointments, and call your doctor if you are having problems. It's also a good idea to know your test results and keep a list of the medicines you take. How can you care for yourself at home? Take care of your body after delivery  · Use pads instead of tampons for the bloody flow that may last as long as 2 weeks. · Ease cramps with ibuprofen (Advil, Motrin). · Ease soreness of hemorrhoids and the area between your vagina and rectum with ice compresses or witch hazel pads. · Ease constipation by drinking lots of fluid and eating high-fiber foods. Ask your doctor about over-the-counter stool softeners. · Cleanse yourself with a gentle squeeze of warm water from a bottle instead of wiping with toilet paper. · Take a sitz bath in warm water several times a day. · Wear a good nursing bra. Ease sore and swollen breasts with warm, wet washcloths. · If you are not breastfeeding, use ice rather than heat for breast soreness.   · Your period may not start for several months if you are breastfeeding. You may bleed more, and longer at first, than you did before you got pregnant. · Wait until you are healed (about 4 to 6 weeks) before you have sexual intercourse. Your doctor will tell you when it is okay to have sex. · Try not to travel with your baby for 5 or 6 weeks. If you take a long car trip, make frequent stops to walk around and stretch. Avoid exhaustion  · Rest every day. Try to nap when your baby naps. · Ask another adult to be with you for a few days after delivery. · Plan for  if you have other children. · Stay flexible so you can eat at odd hours and sleep when you need to. Both you and your baby are making new schedules. · Plan small trips to get out of the house. Change can make you feel less tired. · Ask for help with housework, cooking, and shopping. Remind yourself that your job is to care for your baby. Know about help for postpartum depression  · \"Baby blues\" are common for the first 1 to 2 weeks after birth. You may cry or feel sad or irritable for no reason. · Rest whenever you can. Being tired makes it harder to handle your emotions. · Go for walks with your baby. · Talk to your partner, friends, and family about your feelings. · If your symptoms last for more than a few weeks, or if you feel very depressed, ask your doctor for help. · Postpartum depression can be treated. Support groups and counseling can help. Sometimes medicine can also help. Stay healthy  · Eat healthy foods so you have more energy, make good breast milk, and lose extra baby pounds. · If you breastfeed, avoid alcohol and drugs. If you quit smoking during pregnancy, try to stay smoke-free. · Start daily exercise after 4 to 6 weeks, but rest when you feel tired. · Learn exercises to tone your belly. Do Kegel exercises to regain strength in your pelvic muscles. You can do these exercises while you stand or sit.   ¨ Squeeze the same muscles you would use to stop your urine. Your belly and thighs should not move. ¨ Hold the squeeze for 3 seconds, and then relax for 3 seconds. ¨ Start with 3 seconds. Then add 1 second each week until you are able to squeeze for 10 seconds. ¨ Repeat the exercise 10 to 15 times for each session. Do three or more sessions each day. · Find a class for new mothers and new babies that has an exercise time. · If you had a  birth, give yourself a bit more time before you exercise, and be careful. When should you call for help? Call 911 anytime you think you may need emergency care. For example, call if:    · You passed out (lost consciousness).    Call your doctor now or seek immediate medical care if:    · You have severe vaginal bleeding. This means you are passing blood clots and soaking through a pad each hour for 2 or more hours.     · You are dizzy or lightheaded, or you feel like you may faint.     · You have a fever.     · You have new belly pain, or your pain gets worse.    Watch closely for changes in your health, and be sure to contact your doctor if:    · Your vaginal bleeding seems to be getting heavier.     · You have new or worse vaginal discharge.     · You feel sad, anxious, or hopeless for more than a few days.     · You do not get better as expected. Where can you learn more? Go to http://aleksandra-jordi.info/. Enter A461 in the search box to learn more about \"After Your Delivery (the Postpartum Period): Care Instructions. \"  Current as of: 2017  Content Version: 11.7  © 7031-5231 [employer]. Care instructions adapted under license by [employer] (which disclaims liability or warranty for this information). If you have questions about a medical condition or this instruction, always ask your healthcare professional. [address] any warranty or liability for your use of this information.

## 2019-02-28 ENCOUNTER — OFFICE VISIT (OUTPATIENT)
Dept: FAMILY MEDICINE CLINIC | Age: 39
End: 2019-02-28

## 2019-02-28 VITALS
HEIGHT: 72 IN | RESPIRATION RATE: 16 BRPM | TEMPERATURE: 98 F | OXYGEN SATURATION: 100 % | SYSTOLIC BLOOD PRESSURE: 142 MMHG | DIASTOLIC BLOOD PRESSURE: 88 MMHG | HEART RATE: 76 BPM | WEIGHT: 255.2 LBS | BODY MASS INDEX: 34.57 KG/M2

## 2019-02-28 DIAGNOSIS — J45.20 MILD INTERMITTENT ASTHMA WITHOUT COMPLICATION: ICD-10-CM

## 2019-02-28 DIAGNOSIS — J30.2 SEASONAL ALLERGIC RHINITIS, UNSPECIFIED TRIGGER: Primary | ICD-10-CM

## 2019-02-28 DIAGNOSIS — E55.9 VITAMIN D DEFICIENCY: ICD-10-CM

## 2019-02-28 PROBLEM — Q51.810 ARCUATE UTERUS: Status: RESOLVED | Noted: 2018-08-29 | Resolved: 2019-02-28

## 2019-02-28 PROBLEM — Z98.891 PREVIOUS CESAREAN SECTION: Status: RESOLVED | Noted: 2018-08-29 | Resolved: 2019-02-28

## 2019-02-28 RX ORDER — FLUTICASONE PROPIONATE 50 MCG
SPRAY, SUSPENSION (ML) NASAL
Qty: 1 BOTTLE | Refills: 5 | Status: SHIPPED | OUTPATIENT
Start: 2019-02-28 | End: 2020-11-17 | Stop reason: SDUPTHER

## 2019-02-28 RX ORDER — CETIRIZINE HCL 10 MG
10 TABLET ORAL DAILY
Qty: 30 TAB | Refills: 11 | Status: SHIPPED | OUTPATIENT
Start: 2019-02-28 | End: 2020-11-17 | Stop reason: SDUPTHER

## 2019-02-28 NOTE — PROGRESS NOTES
Chief Complaint   Patient presents with   [facility].     last seen 2015, re-establishing care.  Allergies     Itchy eye, nose, throat, sneezing  for 1 month.  Vitamin D Deficiency     Follow up. 1. Have you been to the ER, urgent care clinic since your last visit? Hospitalized since your last visit? No    2. Have you seen or consulted any other health care providers outside of the [address] [place] [address] since your last visit? Include any pap smears or colon screening.  No

## 2019-02-28 NOTE — PROGRESS NOTES
HISTORY OF PRESENT ILLNESS  Kasandra Mckenzie is a 46 y.o. female. HPI  Presents to re establish care at this practice. History of seasonal AR. Would like rx refilled for zyrtec and flonase. She has been off meds for the winter months. Would like to resume. History of intermittent asthma. Uses albuterol HFA prn. Has not used in a few years. History of vitamin D deficiency. Was recently checked by gyn. Patient put on 50,000 units vitamin D3 weekly x 3 weeks. Currently not taking OTC supplement. Past Medical History:   Diagnosis Date    Anemia     During Pregnancy, taking Iron    Asthma     ventolin inhaler prn    Polycystic disease, ovaries     Sickle cell trait syndrome ([facility])     carrier of the trait, FOB negative     Vitamin D deficiency 8/15/2012     Patient Active Problem List   Diagnosis Code    Vitamin D deficiency E55.9    Allergic rhinitis J30.9    Asthma J45.909    Sickle cell trait ([facility]) D57.3     Current Outpatient Medications   Medication Sig    levonorgestrel (MIRENA) 20 mcg/24 hr (5 years) IUD 1 Device by IntraUTERine route once.  cetirizine (ZYRTEC) 10 mg tablet Take 1 Tab by mouth daily.  fluticasone (FLONASE) 50 mcg/actuation nasal spray 2 sprays to each nostril daily.  ibuprofen (MOTRIN) 600 mg tablet Take 1 Tab by mouth every six (6) hours as needed for Pain. No current facility-administered medications for this visit. Social History     Tobacco Use    Smoking status: Never Smoker    Smokeless tobacco: Never Used   Substance Use Topics    Alcohol use: No    Drug use: No     Visit Vitals  /88   Pulse 76   Temp 98 °F (36.7 °C) (Oral)   Resp 16   Ht 6' (1.829 m)   Wt 255 lb 3.2 oz (115.8 kg)   SpO2 100%   BMI 34.61 kg/m²     Review of Systems   Constitutional: Negative for chills, fever and malaise/fatigue. HENT: Positive for congestion. Respiratory: Negative for cough and shortness of breath.     Cardiovascular: Negative for chest pain and palpitations. All other systems reviewed and are negative. Physical Exam   Constitutional: No distress. Overweight. HENT:   Right Ear: Tympanic membrane and ear canal normal.   Left Ear: Tympanic membrane and ear canal normal.   Nose: Mucosal edema present. Right sinus exhibits no maxillary sinus tenderness and no frontal sinus tenderness. Left sinus exhibits no maxillary sinus tenderness and no frontal sinus tenderness. Mouth/Throat: Oropharynx is clear and moist.   Neck: Neck supple. Cardiovascular: Normal rate, regular rhythm and normal heart sounds. Pulmonary/Chest: Effort normal and breath sounds normal.   Musculoskeletal: She exhibits no edema. Lymphadenopathy:     She has no cervical adenopathy. Skin: Skin is warm and dry. Psychiatric: She has a normal mood and affect. ASSESSMENT and PLAN  Diagnoses and all orders for this visit:    1. Seasonal allergic rhinitis, unspecified trigger  -     cetirizine (ZYRTEC) 10 mg tablet; Take 1 Tab by mouth daily. -     fluticasone (FLONASE) 50 mcg/actuation nasal spray; 2 sprays to each nostril daily. Resume medications as directed. 2. Mild intermittent asthma without complication  Stable. 3. Vitamin D deficiency  -     VITAMIN D, 25 HYDROXY  -     OK HANDLG&/OR CONVEY OF SPEC FOR TR OFFICE TO LAB  -     COLLECTION VENOUS BLOOD,VENIPUNCTURE    Recommend OTC vitamin D3 daily. Recheck labs. Follow up prn if sx worsen or FTI.

## 2019-03-01 LAB — 25(OH)D3+25(OH)D2 SERPL-MCNC: 22.9 NG/ML (ref 30–100)

## 2019-03-21 ENCOUNTER — OFFICE VISIT (OUTPATIENT)
Dept: FAMILY MEDICINE CLINIC | Age: 39
End: 2019-03-21

## 2019-03-21 VITALS
SYSTOLIC BLOOD PRESSURE: 120 MMHG | HEIGHT: 72 IN | TEMPERATURE: 97.8 F | DIASTOLIC BLOOD PRESSURE: 76 MMHG | WEIGHT: 261 LBS | BODY MASS INDEX: 35.35 KG/M2 | RESPIRATION RATE: 18 BRPM | HEART RATE: 73 BPM | OXYGEN SATURATION: 97 %

## 2019-03-21 DIAGNOSIS — J45.20 MILD INTERMITTENT ASTHMA WITHOUT COMPLICATION: ICD-10-CM

## 2019-03-21 DIAGNOSIS — E66.01 SEVERE OBESITY (HCC): ICD-10-CM

## 2019-03-21 DIAGNOSIS — J30.2 SEASONAL ALLERGIC RHINITIS, UNSPECIFIED TRIGGER: ICD-10-CM

## 2019-03-21 DIAGNOSIS — G44.219 EPISODIC TENSION-TYPE HEADACHE, NOT INTRACTABLE: ICD-10-CM

## 2019-03-21 DIAGNOSIS — Z00.00 ENCOUNTER FOR PREVENTIVE CARE: Primary | ICD-10-CM

## 2019-03-21 RX ORDER — GLUCOSAMINE SULFATE 1500 MG
1000 POWDER IN PACKET (EA) ORAL DAILY
COMMUNITY
End: 2020-11-17

## 2019-03-21 RX ORDER — PNV NO.95/FERROUS FUM/FOLIC AC 28MG-0.8MG
TABLET ORAL
Refills: 4 | COMMUNITY
Start: 2019-02-08 | End: 2020-11-17 | Stop reason: SDUPTHER

## 2019-03-21 RX ORDER — ALBUTEROL SULFATE 90 UG/1
2 AEROSOL, METERED RESPIRATORY (INHALATION)
Qty: 1 INHALER | Refills: 3 | Status: SHIPPED | OUTPATIENT
Start: 2019-03-21 | End: 2020-11-17 | Stop reason: SDUPTHER

## 2019-03-21 NOTE — PROGRESS NOTES
[address] Abhishek, [address] 
[phone] Date of visit: 3/21/2019 Subjective:  
  
History obtained from:  the patient. Chente Valdez is a 46 y.o. female who presents today for physical 
Generally feels ok, gets headaches sometimes, maybe every other day Not severe Not sure where they hurt on her head, maybe all over Doesn't take anything for it, just resting for a while helps Has 2 yr and a 11 mo old, also 2 older kids So, life is very busy Just started a new exercise program with stretching, resistance, walking This is heaviest she has been Really doesn't eat all that much Low metabolism Drinks water, coffee 
occsaional juice No etoh Still breastfeeding Has a Mirena, ok with it so far. At first had BV Going to see Dr. Isauro Espinosa Monday History of asthma Comes and goes Worse when it gets hot but working out makes it better Doesn't think it is that bad Doesn't have inhaler now, might use one every 6 mo if needed On flonase and zyrtec for about 10 years now for allergies, Feb through July and again in the fall 
 
bp high today but wasn't even when pregnant Patient Active Problem List  
 Diagnosis Date Noted  Episodic tension-type headache, not intractable 03/21/2019  Severe obesity ([facility] [address] 03/21/2019  Sickle cell trait ([facility] [address] 05/11/2016  Vitamin D deficiency 08/15/2012  Allergic rhinitis 08/15/2012  Asthma 08/15/2012 Current Outpatient Medications Medication Sig Dispense Refill  PRENATAL MULTIVITAMINS 28 mg iron- 800 mcg tab tablet TK 1 T PO D WITH FOOD  4  
 albuterol (PROVENTIL HFA, VENTOLIN HFA, PROAIR HFA) 90 mcg/actuation inhaler Take 2 Puffs by inhalation every four (4) hours as needed for Wheezing or Shortness of Breath. Ok to sub any brand 1 Inhaler 3  cholecalciferol (VITAMIN D3) 1,000 unit cap Take 1 Cap by mouth daily.  levonorgestrel (MIRENA) 20 mcg/24 hr (5 years) IUD 1 Device by IntraUTERine route once.  cetirizine (ZYRTEC) 10 mg tablet Take 1 Tab by mouth daily. 30 Tab 11  
 fluticasone (FLONASE) 50 mcg/actuation nasal spray 2 sprays to each nostril daily. 1 Bottle 5 Allergies Allergen Reactions  Latex Rash and Itching Past Medical History:  
Diagnosis Date  Anemia During Pregnancy, taking Iron  Arcuate uterus 2018  Asthma   
 ventolin inhaler prn  Polycystic disease, ovaries  Previous  section 2018  Sickle cell trait syndrome ([facility])   
 carrier of the trait, FOB negative  Vitamin D deficiency 8/15/2012 Past Surgical History:  
[facility]    
   HX  SECTION    
 HX OTHER SURGICAL    
 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   
     pacer  Stroke Mother  Hypertension Father  Hypertension Sister  Hypertension Brother  Diabetes Maternal Uncle Social History Tobacco Use  Smoking status: Never Smoker  Smokeless tobacco: Never Used Substance Use Topics  Alcohol use: No  
  
Social History Social History Narrative  Not on file Review of Systems Card: denies chest pain GI: denies hematochezia or constipation MSK: admits to some tightness in right leg, need to stretch it. Has some problems with knees. Otherwise no body pains. (like if she stretches it feels tight) Pulm: denies shortness of breath 3 most recent PHQ Screens 3/21/2019 Little interest or pleasure in doing things Not at all Feeling down, depressed, irritable, or hopeless Not at all Total Score PHQ 2 0 Objective:  
 
Vitals:  
 19 1345 19 1405 BP: 146/86 120/76 Pulse: 73 Resp: 18 Temp: 97.8 °F (36.6 °C) TempSrc: Oral   
SpO2: 97% Weight: 261 lb (118.4 kg) Height: 6' (1.829 m) Body mass index is 35.4 kg/m². General: stated age,obese, and in NAD Eyes: PERRL, EOMI, no redness or drainage Nose: no drainage Mouth: no lesions Throat: no erythema, exudate or swelling Neck: supple, symmetrical, trachea midline, no adenopathy and thyroid: not enlarged, symmetric, no tenderness/mass/nodules Lungs:  clear to auscultation w/o rales, rhonchi, wheezes w/normal effort and no use of accessory muscles of respiration Heart: regular rate and rhythm, S1, S2 normal, no murmur, click, rub or gallop Abdomen: soft, nontender, no masses Ext:  No edema noted. Lymph: no cervical adenopathy appreciated Skin:  Normal. and no rash or abnormalities Neuro: CN 2-12 intact, strength 5/5, patellar reflexes 2+ bilaterally, sensation intact to light touch bilaterally, cerebellar testing normal  
Psych: alert and oriented to person, place, time and situation and Speech: appropriate quality, quantity and organization of sentences Assessment/Plan: ICD-10-CM ICD-9-CM 1. Encounter for preventive care Z00.00 V70.0 CBC WITH AUTOMATED DIFF  
   METABOLIC PANEL, COMPREHENSIVE  
   LIPID PANEL  
   HEMOGLOBIN A1C WITH EAG  
   TSH 3RD GENERATION 2. Seasonal allergic rhinitis, unspecified trigger J30.2 477.9 3. Mild intermittent asthma without complication O49.66 547.04   
4. Severe obesity ([facility] [address] E66.01 278.01   
5. Episodic tension-type headache, not intractable G44.219 339.11 Orders Placed This Encounter  CBC WITH AUTOMATED DIFF  
 METABOLIC PANEL, COMPREHENSIVE  LIPID PANEL  
 HEMOGLOBIN A1C WITH EAG  
 TSH 3RD GENERATION  
 PRENATAL MULTIVITAMINS 28 mg iron- 800 mcg tab tablet  albuterol (PROVENTIL HFA, VENTOLIN HFA, PROAIR HFA) 90 mcg/actuation inhaler  cholecalciferol (VITAMIN D3) 1,000 unit cap Health Maintenance Topic Date Due  Pneumococcal 0-64 years (1 of 1 - PPSV23) 02/21/1987  Influenza Age 5 to Adult  08/12/2019 (Originally 8/1/2018)  PAP AKA CERVICAL CYTOLOGY  10/10/2020  
 DTaP/Tdap/Td series (3 - Td) 06/12/2028 Preventive care up to date Could consider pneumovax at some point but asthma VERY mild Will give inhaler just in case Symptoms so infrequent mild that I don't think would benefit from doing PFTs Reviewed worrisome signs or symptoms for which to call. Allergies do ok with otc products Advised continued improvement diet/exercise for weight loss She is motivated to work on that now Routine labs as above Discussed the diagnosis and plan and she expressed understanding. Follow-up and Dispositions · Return in about 1 year (around 3/21/2020).  
  
 
 
Stephane Isabel MD

## 2019-03-21 NOTE — PROGRESS NOTES
Chief Complaint Patient presents with  Complete Physical  
 
 
1. Have you been to the ER, urgent care clinic since your last visit? Hospitalized since your last visit? No 
 
2. Have you seen or consulted any other health care providers outside of the [address] [place] [address] since your last visit? Include any pap smears or colon screening.  No

## 2019-03-21 NOTE — PATIENT INSTRUCTIONS

## 2019-03-22 LAB
ALBUMIN SERPL-MCNC: 4.3 G/DL (ref 3.5–5.5)
ALBUMIN/GLOB SERPL: 1.3 {RATIO} (ref 1.2–2.2)
ALP SERPL-CCNC: 77 IU/L (ref 39–117)
ALT SERPL-CCNC: 17 IU/L (ref 0–32)
AST SERPL-CCNC: 17 IU/L (ref 0–40)
BASOPHILS # BLD AUTO: 0 X10E3/UL (ref 0–0.2)
BASOPHILS NFR BLD AUTO: 0 %
BILIRUB SERPL-MCNC: 0.7 MG/DL (ref 0–1.2)
BUN SERPL-MCNC: 10 MG/DL (ref 6–20)
BUN/CREAT SERPL: 12 (ref 9–23)
CALCIUM SERPL-MCNC: 9.4 MG/DL (ref 8.7–10.2)
CHLORIDE SERPL-SCNC: 103 MMOL/L (ref 96–106)
CHOLEST SERPL-MCNC: 177 MG/DL (ref 100–199)
CO2 SERPL-SCNC: 22 MMOL/L (ref 20–29)
CREAT SERPL-MCNC: 0.82 MG/DL (ref 0.57–1)
EOSINOPHIL # BLD AUTO: 0.3 X10E3/UL (ref 0–0.4)
EOSINOPHIL NFR BLD AUTO: 4 %
ERYTHROCYTE [DISTWIDTH] IN BLOOD BY AUTOMATED COUNT: 13.5 % (ref 12.3–15.4)
EST. AVERAGE GLUCOSE BLD GHB EST-MCNC: 111 MG/DL
GLOBULIN SER CALC-MCNC: 3.3 G/DL (ref 1.5–4.5)
GLUCOSE SERPL-MCNC: 90 MG/DL (ref 65–99)
HBA1C MFR BLD: 5.5 % (ref 4.8–5.6)
HCT VFR BLD AUTO: 37.3 % (ref 34–46.6)
HDLC SERPL-MCNC: 40 MG/DL
HGB BLD-MCNC: 11.9 G/DL (ref 11.1–15.9)
IMM GRANULOCYTES # BLD AUTO: 0 X10E3/UL (ref 0–0.1)
IMM GRANULOCYTES NFR BLD AUTO: 0 %
INTERPRETATION, 910389: NORMAL
LDLC SERPL CALC-MCNC: 112 MG/DL (ref 0–99)
LYMPHOCYTES # BLD AUTO: 2.2 X10E3/UL (ref 0.7–3.1)
LYMPHOCYTES NFR BLD AUTO: 30 %
MCH RBC QN AUTO: 26.6 PG (ref 26.6–33)
MCHC RBC AUTO-ENTMCNC: 31.9 G/DL (ref 31.5–35.7)
MCV RBC AUTO: 83 FL (ref 79–97)
MONOCYTES # BLD AUTO: 0.7 X10E3/UL (ref 0.1–0.9)
MONOCYTES NFR BLD AUTO: 9 %
NEUTROPHILS # BLD AUTO: 4.2 X10E3/UL (ref 1.4–7)
NEUTROPHILS NFR BLD AUTO: 57 %
PLATELET # BLD AUTO: 228 X10E3/UL (ref 150–379)
POTASSIUM SERPL-SCNC: 4.3 MMOL/L (ref 3.5–5.2)
PROT SERPL-MCNC: 7.6 G/DL (ref 6–8.5)
RBC # BLD AUTO: 4.47 X10E6/UL (ref 3.77–5.28)
SODIUM SERPL-SCNC: 139 MMOL/L (ref 134–144)
TRIGL SERPL-MCNC: 126 MG/DL (ref 0–149)
TSH SERPL DL<=0.005 MIU/L-ACNC: 1.57 UIU/ML (ref 0.45–4.5)
VLDLC SERPL CALC-MCNC: 25 MG/DL (ref 5–40)
WBC # BLD AUTO: 7.3 X10E3/UL (ref 3.4–10.8)

## 2020-09-01 ENCOUNTER — APPOINTMENT (RX ONLY)
Dept: URBAN - METROPOLITAN AREA CLINIC 87 | Facility: CLINIC | Age: 40
Setting detail: DERMATOLOGY
End: 2020-09-01

## 2020-09-01 DIAGNOSIS — L259 CONTACT DERMATITIS AND OTHER ECZEMA, UNSPECIFIED CAUSE: ICD-10-CM

## 2020-09-01 PROBLEM — L30.8 OTHER SPECIFIED DERMATITIS: Status: ACTIVE | Noted: 2020-09-01

## 2020-09-01 PROCEDURE — ? COUNSELING

## 2020-09-01 PROCEDURE — ? PRESCRIPTION

## 2020-09-01 PROCEDURE — 99202 OFFICE O/P NEW SF 15 MIN: CPT

## 2020-09-01 RX ORDER — BETAMETHASONE DIPROPIONATE 0.5 MG/G
OINTMENT, AUGMENTED TOPICAL
Qty: 1 | Refills: 1 | Status: ERX | COMMUNITY
Start: 2020-09-01

## 2020-09-01 RX ADMIN — BETAMETHASONE DIPROPIONATE: 0.5 OINTMENT, AUGMENTED TOPICAL at 00:00

## 2020-09-01 ASSESSMENT — LOCATION DETAILED DESCRIPTION DERM
LOCATION DETAILED: LEFT VENTRAL DISTAL FOREARM
LOCATION DETAILED: GENITALIA
LOCATION DETAILED: RIGHT VENTRAL DISTAL FOREARM

## 2020-09-01 ASSESSMENT — PAIN INTENSITY VAS: HOW INTENSE IS YOUR PAIN 0 BEING NO PAIN, 10 BEING THE MOST SEVERE PAIN POSSIBLE?: NO PAIN

## 2020-09-01 ASSESSMENT — LOCATION SIMPLE DESCRIPTION DERM
LOCATION SIMPLE: RIGHT FOREARM
LOCATION SIMPLE: GENITALIA
LOCATION SIMPLE: LEFT FOREARM

## 2020-09-01 ASSESSMENT — LOCATION ZONE DERM
LOCATION ZONE: TRUNK
LOCATION ZONE: ARM

## 2020-09-04 ENCOUNTER — RX ONLY (OUTPATIENT)
Age: 40
Setting detail: RX ONLY
End: 2020-09-04

## 2020-09-04 RX ORDER — BETAMETHASONE DIPROPIONATE 0.5 MG/G
OINTMENT, AUGMENTED TOPICAL
Qty: 3 | Refills: 1 | Status: ERX

## 2020-11-16 PROBLEM — B96.89 BACTERIAL VAGINOSIS: Status: RESOLVED | Noted: 2018-11-27 | Resolved: 2020-11-16

## 2020-11-16 PROBLEM — Z91.89 AT RISK OF DIABETES MELLITUS: Status: RESOLVED | Noted: 2020-11-16 | Resolved: 2020-11-16

## 2020-11-16 PROBLEM — R10.2 ACUTE PELVIC PAIN: Status: RESOLVED | Noted: 2019-02-08 | Resolved: 2020-11-16

## 2020-11-16 PROBLEM — Z91.89 AT RISK OF DIABETES MELLITUS: Status: ACTIVE | Noted: 2020-11-16

## 2020-11-16 PROBLEM — O28.9 ABNORMAL FINDINGS ON ANTENATAL SCREENING OF MOTHER: Status: ACTIVE | Noted: 2018-02-09

## 2020-11-16 PROBLEM — R10.2 ACUTE PELVIC PAIN: Status: ACTIVE | Noted: 2019-02-08

## 2020-11-16 PROBLEM — N76.0 BACTERIAL VAGINOSIS: Status: RESOLVED | Noted: 2018-11-27 | Resolved: 2020-11-16

## 2020-11-16 PROBLEM — N76.0 BACTERIAL VAGINOSIS: Status: ACTIVE | Noted: 2018-11-27

## 2020-11-16 PROBLEM — O34.219 UTERINE SCAR FROM PREVIOUS CESAREAN DELIVERY, WITH DELIVERY: Status: ACTIVE | Noted: 2020-11-16

## 2020-11-16 PROBLEM — O36.8290: Status: RESOLVED | Noted: 2020-11-16 | Resolved: 2020-11-16

## 2020-11-16 PROBLEM — Z22.330 CARRIER OF GROUP B STREPTOCOCCUS: Status: RESOLVED | Noted: 2020-11-16 | Resolved: 2020-11-16

## 2020-11-16 PROBLEM — O28.9 ABNORMAL FINDINGS ON ANTENATAL SCREENING OF MOTHER: Status: RESOLVED | Noted: 2018-02-09 | Resolved: 2020-11-16

## 2020-11-16 PROBLEM — Z22.330 CARRIER OF GROUP B STREPTOCOCCUS: Status: ACTIVE | Noted: 2020-11-16

## 2020-11-16 PROBLEM — B96.89 BACTERIAL VAGINOSIS: Status: ACTIVE | Noted: 2018-11-27

## 2020-11-16 PROBLEM — Q51.9 CONGENITAL UTERINE ANOMALY: Status: ACTIVE | Noted: 2020-11-16

## 2020-11-16 PROBLEM — O36.8290: Status: ACTIVE | Noted: 2020-11-16

## 2020-11-16 PROBLEM — O34.219 UTERINE SCAR FROM PREVIOUS CESAREAN DELIVERY, WITH DELIVERY: Status: RESOLVED | Noted: 2020-11-16 | Resolved: 2020-11-16

## 2020-11-17 ENCOUNTER — OFFICE VISIT (OUTPATIENT)
Dept: FAMILY MEDICINE CLINIC | Age: 40
End: 2020-11-17
Payer: MEDICAID

## 2020-11-17 VITALS
HEART RATE: 85 BPM | RESPIRATION RATE: 18 BRPM | OXYGEN SATURATION: 99 % | TEMPERATURE: 98.3 F | SYSTOLIC BLOOD PRESSURE: 125 MMHG | WEIGHT: 228 LBS | HEIGHT: 72 IN | BODY MASS INDEX: 30.88 KG/M2 | DIASTOLIC BLOOD PRESSURE: 84 MMHG

## 2020-11-17 DIAGNOSIS — E61.1 IRON DEFICIENCY: ICD-10-CM

## 2020-11-17 DIAGNOSIS — R53.83 FATIGUE, UNSPECIFIED TYPE: Primary | ICD-10-CM

## 2020-11-17 DIAGNOSIS — E66.9 OBESITY (BMI 30.0-34.9): ICD-10-CM

## 2020-11-17 DIAGNOSIS — E28.2 POLYCYSTIC OVARIES: ICD-10-CM

## 2020-11-17 DIAGNOSIS — G44.219 EPISODIC TENSION-TYPE HEADACHE, NOT INTRACTABLE: ICD-10-CM

## 2020-11-17 DIAGNOSIS — J30.2 SEASONAL ALLERGIC RHINITIS, UNSPECIFIED TRIGGER: ICD-10-CM

## 2020-11-17 DIAGNOSIS — E55.9 VITAMIN D DEFICIENCY: ICD-10-CM

## 2020-11-17 LAB
25(OH)D3 SERPL-MCNC: 24 NG/ML (ref 30–100)
ALBUMIN SERPL-MCNC: 3.8 G/DL (ref 3.5–5)
ALBUMIN/GLOB SERPL: 1 {RATIO} (ref 1.1–2.2)
ALP SERPL-CCNC: 61 U/L (ref 45–117)
ALT SERPL-CCNC: 28 U/L (ref 12–78)
ANION GAP SERPL CALC-SCNC: 8 MMOL/L (ref 5–15)
AST SERPL-CCNC: 18 U/L (ref 15–37)
BASOPHILS # BLD: 0 K/UL (ref 0–0.1)
BASOPHILS NFR BLD: 0 % (ref 0–1)
BILIRUB SERPL-MCNC: 0.6 MG/DL (ref 0.2–1)
BUN SERPL-MCNC: 10 MG/DL (ref 6–20)
BUN/CREAT SERPL: 12 (ref 12–20)
CALCIUM SERPL-MCNC: 9 MG/DL (ref 8.5–10.1)
CHLORIDE SERPL-SCNC: 108 MMOL/L (ref 97–108)
CO2 SERPL-SCNC: 25 MMOL/L (ref 21–32)
CREAT SERPL-MCNC: 0.84 MG/DL (ref 0.55–1.02)
DIFFERENTIAL METHOD BLD: NORMAL
EOSINOPHIL # BLD: 0.2 K/UL (ref 0–0.4)
EOSINOPHIL NFR BLD: 3 % (ref 0–7)
ERYTHROCYTE [DISTWIDTH] IN BLOOD BY AUTOMATED COUNT: 12.4 % (ref 11.5–14.5)
GLOBULIN SER CALC-MCNC: 3.8 G/DL (ref 2–4)
GLUCOSE SERPL-MCNC: 79 MG/DL (ref 65–100)
HCT VFR BLD AUTO: 37.9 % (ref 35–47)
HGB BLD-MCNC: 12.2 G/DL (ref 11.5–16)
IMM GRANULOCYTES # BLD AUTO: 0 K/UL (ref 0–0.04)
IMM GRANULOCYTES NFR BLD AUTO: 0 % (ref 0–0.5)
IRON SATN MFR SERPL: 23 % (ref 20–50)
IRON SERPL-MCNC: 55 UG/DL (ref 35–150)
LYMPHOCYTES # BLD: 2 K/UL (ref 0.8–3.5)
LYMPHOCYTES NFR BLD: 27 % (ref 12–49)
MCH RBC QN AUTO: 28.2 PG (ref 26–34)
MCHC RBC AUTO-ENTMCNC: 32.2 G/DL (ref 30–36.5)
MCV RBC AUTO: 87.7 FL (ref 80–99)
MONOCYTES # BLD: 0.8 K/UL (ref 0–1)
MONOCYTES NFR BLD: 10 % (ref 5–13)
NEUTS SEG # BLD: 4.6 K/UL (ref 1.8–8)
NEUTS SEG NFR BLD: 60 % (ref 32–75)
NRBC # BLD: 0 K/UL (ref 0–0.01)
NRBC BLD-RTO: 0 PER 100 WBC
PLATELET # BLD AUTO: 198 K/UL (ref 150–400)
PMV BLD AUTO: 11.9 FL (ref 8.9–12.9)
POTASSIUM SERPL-SCNC: 4.2 MMOL/L (ref 3.5–5.1)
PROT SERPL-MCNC: 7.6 G/DL (ref 6.4–8.2)
RBC # BLD AUTO: 4.32 M/UL (ref 3.8–5.2)
SODIUM SERPL-SCNC: 141 MMOL/L (ref 136–145)
T4 FREE SERPL-MCNC: 0.8 NG/DL (ref 0.8–1.5)
TIBC SERPL-MCNC: 239 UG/DL (ref 250–450)
TSH SERPL DL<=0.05 MIU/L-ACNC: 1.53 UIU/ML (ref 0.36–3.74)
WBC # BLD AUTO: 7.7 K/UL (ref 3.6–11)

## 2020-11-17 PROCEDURE — 99214 OFFICE O/P EST MOD 30 MIN: CPT | Performed by: FAMILY MEDICINE

## 2020-11-17 RX ORDER — CETIRIZINE HCL 10 MG
10 TABLET ORAL DAILY
Qty: 30 TAB | Refills: 11 | Status: SHIPPED | OUTPATIENT
Start: 2020-11-17

## 2020-11-17 RX ORDER — FLUTICASONE PROPIONATE 50 MCG
SPRAY, SUSPENSION (ML) NASAL
Qty: 1 BOTTLE | Refills: 5 | Status: SHIPPED | OUTPATIENT
Start: 2020-11-17 | End: 2022-07-08

## 2020-11-17 RX ORDER — ALBUTEROL SULFATE 90 UG/1
2 AEROSOL, METERED RESPIRATORY (INHALATION)
Qty: 1 INHALER | Refills: 3 | Status: SHIPPED | OUTPATIENT
Start: 2020-11-17 | End: 2022-07-08

## 2020-11-17 RX ORDER — BISMUTH SUBSALICYLATE 262 MG
1 TABLET,CHEWABLE ORAL DAILY
COMMUNITY
Start: 2020-11-17

## 2020-11-17 NOTE — PROGRESS NOTES
Chief Complaint   Patient presents with    Complete Physical     1. Have you been to the ER, urgent care clinic since your last visit? Hospitalized since your last visit? No    2. Have you seen or consulted any other health care providers outside of the [address] [place] [address] since your last visit? Include any pap smears or colon screening.  No       Pt saw OBGYN who wants us to check IRON, TSH, and VIT D

## 2020-11-17 NOTE — PROGRESS NOTES
[facility] Fabi. Abhishek, [address]  [phone]             Date of visit: 11/17/2020   Subjective:      History obtained from:  the patient.   Freddy Montero is a 45 y.o. female who presents today for   Chief Complaint   Patient presents with    Complete Physical     Has been feeling tired, cold a lot  Has had low iron and vit D in the past  Sometimes gets light-headed/dizzy  Does forget to eat, just gets busy doing housework    Was exercising but stopped because she worried about a hernia but gyn just told her was diastasis recti  During pandemic lost some weight, about 40 pounds    Headaches less, cut back on caffeine and drinking water helps    Pandemic has not been too hard on her, likes having quieter time at home    3 kids at home keep her busy    Eating mostly veggies and protein, cut out most carbs, french white carbs  No fast food  Weight was 261 almost 2 years ago, now 228lb  Low metabolism, difficulty losing weight     Pap per gyn Dr. Gunnar Marquez   Has Mirena   Has PCOS    Periods weren't happening with IUD  They took it out recently, had 1 period and put in a new IUD yesterday    History of very mild asthma,   Rarely bothers her but wants inhaler just in case  Allergies are the main trigger      On flonase and zyrtec for about 10 years now for allergies, Feb through July and again in the fall     Vit D low last year, other labs ok   Wants to do labs due to fatigue, though  Taking a multivitamin    Declines flu and pneumnia shots for now; flu shot has made her sick repeatedly    Patient Active Problem List    Diagnosis Date Noted    Congenital uterine anomaly 11/16/2020    Episodic tension-type headache, not intractable 03/21/2019    Severe obesity (Patt Mcneil) 03/21/2019    Sickle cell trait (Patt Mcneil) 05/11/2016    Polycystic ovaries 10/09/2014    Vitamin D deficiency 08/15/2012    Allergic rhinitis 08/15/2012    Asthma 08/15/2012     Current Outpatient Medications   Medication Sig Dispense Refill    cetirizine (ZYRTEC) 10 mg tablet Take 1 Tab by mouth daily. 30 Tab 11    fluticasone propionate (FLONASE) 50 mcg/actuation nasal spray 2 sprays to each nostril daily. 1 Bottle 5    albuterol (PROVENTIL HFA, VENTOLIN HFA, PROAIR HFA) 90 mcg/actuation inhaler Take 2 Puffs by inhalation every four (4) hours as needed for Wheezing or Shortness of Breath. Ok to sub any brand 1 Inhaler 3    multivitamin (ONE A DAY) tablet Take 1 Tab by mouth daily.  levonorgestrel (MIRENA) 20 mcg/24 hr (5 years) IUD 1 Device by IntraUTERine route once.        Allergies   Allergen Reactions    Latex Rash and Itching     Past Medical History:   Diagnosis Date    Anemia     During Pregnancy, taking Iron    Arcuate uterus 2018    Asthma     ventolin inhaler prn    Polycystic disease, ovaries     Previous  section 2018    Sickle cell trait syndrome (HCC)     carrier of the trait, FOB negative     Vitamin D deficiency 8/15/2012     Past Surgical History:   Procedure Laterality Date     DELIVERY ONLY          HX  SECTION      HX OTHER SURGICAL      cornea transplant in Left eye ,      Family History   Problem Relation Age of Onset    Hypertension Mother     Diabetes Mother     Heart Disease Mother         rony Chavez Stroke Mother     Hypertension Father     Hypertension Sister     Hypertension Brother     Diabetes Maternal Uncle      Social History     Tobacco Use    Smoking status: Never Smoker    Smokeless tobacco: Never Used   Substance Use Topics    Alcohol use: No      Social History     Social History Narrative    , 3 kids at home        Review of Systems  Card: denies chest pain  Pulm: denies shortness of breath   GI: denies hematochezia or melena    3 most recent PHQ Screens 2020   Little interest or pleasure in doing things Not at all   Feeling down, depressed, irritable, or hopeless Not at all   Total Score PHQ 2 0       Objective:     Vitals:    11/17/20 1406   BP: 125/84   Pulse: 85   Resp: 18   Temp: 98.3 °F (36.8 °C)   TempSrc: Temporal   SpO2: 99%   Weight: 228 lb (103.4 kg)   Height: 6' (1.829 m)     Body mass index is 30.92 kg/m². General: stated age,obese, and in NAD  Eyes: PERRL, EOMI, no redness or drainage  Ears: TMs clear  Nose: no drainage  Mouth: no lesions  Throat: no erythema, exudate or swelling  Neck: supple, symmetrical, trachea midline, no adenopathy and thyroid: not enlarged, symmetric, no tenderness/mass/nodules  Lungs:  clear to auscultation w/o rales, rhonchi, wheezes w/normal effort and no use of accessory muscles of respiration   Heart: regular rate and rhythm, S1, S2 normal, no murmur, click, rub or gallop  Abdomen: soft, nontender, no masses  Ext:  No edema noted. Lymph: no cervical adenopathy appreciated  Skin:  Normal. and no rash or abnormalities   Neuro: normal gait, CN 2-12 intact  Psych: alert and oriented to person, place, time and situation and Speech: appropriate quality, quantity and organization of sentences    Assessment/Plan:       ICD-10-CM ICD-9-CM    1. Fatigue, unspecified type  R53.83 780.79 CBC WITH AUTOMATED DIFF      METABOLIC PANEL, COMPREHENSIVE      TSH 3RD GENERATION      T4, FREE   2. Seasonal allergic rhinitis, unspecified trigger  J30.2 477.9 cetirizine (ZYRTEC) 10 mg tablet      fluticasone propionate (FLONASE) 50 mcg/actuation nasal spray   3. Iron deficiency  E61.1 280.9 IRON PROFILE   4. Vitamin D deficiency  E55.9 268.9 VITAMIN D, 25 HYDROXY   5. Obesity (BMI 30.0-34. 9)  E66.9 278.00    6.  Polycystic ovaries  E28.2 256.4    7. Episodic tension-type headache, not intractable  G44.219 339.11         Orders Placed This Encounter    CBC WITH AUTOMATED DIFF    METABOLIC PANEL, COMPREHENSIVE    TSH 3RD GENERATION    T4, FREE    VITAMIN D, 25 HYDROXY    IRON PROFILE    cetirizine (ZYRTEC) 10 mg tablet    fluticasone propionate (FLONASE) 50 mcg/actuation nasal spray    albuterol (PROVENTIL HFA, VENTOLIN HFA, PROAIR HFA) 90 mcg/actuation inhaler    multivitamin (ONE A DAY) tablet       Health Maintenance   Topic Date Due    Pneumococcal 0-64 years (1 of 1 - PPSV23) 02/21/1987    Flu Vaccine (1) 06/30/2021 (Originally 9/1/2020)    PAP AKA CERVICAL CYTOLOGY  11/16/2022    DTaP/Tdap/Td series (2 - Td) 06/06/2028     Doing well overall  Fabulous job with lifestyle changes, diet/exercise and weight loss!!  Encouraged her to keep up the good work and consider these long-term lifestyle changes  Will do labs due to fatigue  Encouraged more regular sleep schedule, but her kids make that challenging    Discussed the diagnosis and plan and she expressed understanding. Follow-up and Dispositions    · Return in about 1 year (around 11/17/2021).          Nakul Duarte MD

## 2020-11-18 RX ORDER — ERGOCALCIFEROL 1.25 MG/1
50000 CAPSULE ORAL
Qty: 12 CAP | Refills: 0 | Status: SHIPPED | OUTPATIENT
Start: 2020-11-18 | End: 2021-02-04

## 2021-08-11 ENCOUNTER — OFFICE VISIT (OUTPATIENT)
Dept: FAMILY MEDICINE CLINIC | Age: 41
End: 2021-08-11
Payer: MEDICAID

## 2021-08-11 VITALS
OXYGEN SATURATION: 98 % | HEIGHT: 72 IN | BODY MASS INDEX: 31.59 KG/M2 | HEART RATE: 77 BPM | SYSTOLIC BLOOD PRESSURE: 135 MMHG | WEIGHT: 233.2 LBS | DIASTOLIC BLOOD PRESSURE: 85 MMHG | RESPIRATION RATE: 18 BRPM | TEMPERATURE: 98.7 F

## 2021-08-11 DIAGNOSIS — R42 LIGHTHEADED: ICD-10-CM

## 2021-08-11 DIAGNOSIS — R73.02 GLUCOSE INTOLERANCE (IMPAIRED GLUCOSE TOLERANCE): ICD-10-CM

## 2021-08-11 DIAGNOSIS — E55.9 VITAMIN D DEFICIENCY: Primary | ICD-10-CM

## 2021-08-11 PROCEDURE — 99213 OFFICE O/P EST LOW 20 MIN: CPT | Performed by: NURSE PRACTITIONER

## 2021-08-11 RX ORDER — MELATONIN
1000 DAILY
Qty: 30 TABLET | Refills: 2 | Status: SHIPPED | OUTPATIENT
Start: 2021-08-11

## 2021-08-11 NOTE — PROGRESS NOTES
Chief Complaint   Patient presents with    Other     cold all day and sweat all night. body is hot at night       1. Have you been to the ER, urgent care clinic since your last visit? Hospitalized since your last visit? No    2. Have you seen or consulted any other health care providers outside of the [address] [place] [address] since your last visit? Include any pap smears or colon screening.  No    3 most recent PHQ Screens 8/11/2021   Little interest or pleasure in doing things Not at all   Feeling down, depressed, irritable, or hopeless Not at all   Total Score PHQ 2 0

## 2021-08-11 NOTE — PROGRESS NOTES
[facility] [facility] Note  Subjective:      Kaylee Haque is a 37 y.o. female who presents for lightheadedness and feels cold during day and hot at night. Denies chills, fever, earaches. Denies vertigo. She has history of anemia, vitamin D deficiency, obesity and asthma. She states that some days she doesn't eat much and then she feels very hungry. 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 She has family history of diabetes. Past Medical History:   Diagnosis Date    Anemia     During Pregnancy, taking Iron    Arcuate uterus 2018    Asthma     ventolin inhaler prn    Polycystic disease, ovaries     Previous  section 2018    Sickle cell trait syndrome (HCC)     carrier of the trait, FOB negative     Vitamin D deficiency 8/15/2012     Past Surgical History:   Procedure Laterality Date    HX  SECTION      HX OTHER SURGICAL      cornea transplant in Left eye ,     [place]  DELIVERY ONLY               Current Outpatient Medications   Medication Sig Dispense Refill    cholecalciferol (VITAMIN D3) (1000 Units /25 mcg) tablet Take 1 Tablet by mouth daily. 30 Tablet 2    cetirizine (ZYRTEC) 10 mg tablet Take 1 Tab by mouth daily. 30 Tab 11    levonorgestrel (MIRENA) 20 mcg/24 hr (5 years) IUD 1 Device by IntraUTERine route once.  fluticasone propionate (FLONASE) 50 mcg/actuation nasal spray 2 sprays to each nostril daily. (Patient not taking: Reported on 2021) 1 Bottle 5    albuterol (PROVENTIL HFA, VENTOLIN HFA, PROAIR HFA) 90 mcg/actuation inhaler Take 2 Puffs by inhalation every four (4) hours as needed for Wheezing or Shortness of Breath. Ok to sub any brand (Patient not taking: Reported on 2021) 1 Inhaler 3    multivitamin (ONE A DAY) tablet Take 1 Tab by mouth daily.  (Patient not taking: Reported on 2021)       Allergies   Allergen Reactions    Latex Rash and Itching       ROS:   Complete review of systems was reviewed with pertinent information listed in HPI. Review of Systems   Constitutional: Negative. HENT: Negative. Respiratory: Negative. Cardiovascular: Negative. Gastrointestinal: Negative. Genitourinary: Negative. Objective:     Visit Vitals  /85   Pulse 77   Temp 98.7 °F (37.1 °C) (Temporal)   Resp 18   Ht 6' (1.829 m)   Wt 233 lb 3.2 oz (105.8 kg)   SpO2 98%   BMI 31.63 kg/m²       Vitals and Nurse Documentation reviewed. Physical Exam  Constitutional:       Appearance: Normal appearance. She is obese. HENT:      Mouth/Throat:      Mouth: Mucous membranes are moist.   Cardiovascular:      Rate and Rhythm: Normal rate and regular rhythm. Pulses: Normal pulses. Heart sounds: Normal heart sounds. Pulmonary:      Effort: Pulmonary effort is normal.      Breath sounds: Normal breath sounds. Abdominal:      General: Bowel sounds are normal.      Palpations: Abdomen is soft. Musculoskeletal:      Cervical back: Normal range of motion and neck supple. Neurological:      Mental Status: She is alert. Psychiatric:         Mood and Affect: Mood normal.         Thought Content: Thought content normal.         Assessment/Plan:     Diagnoses and all orders for this visit:    1. Vitamin D deficiency  -     cholecalciferol (VITAMIN D3) (1000 Units /25 mcg) tablet; Take 1 Tablet by mouth daily. 2. Glucose intolerance (impaired glucose tolerance)  -     HEMOGLOBIN A1C WITH EAG; Future    3. Lightheaded  -     CBC W/O DIFF; Future  -     METABOLIC PANEL, COMPREHENSIVE;  Future    advised to eat regularly, try to hydrate   Await labs      Pt expressed understanding with the diagnosis and plan        Discussed expected course/resolution/complications of diagnosis in detail with patient.    Medication risks/benefits/costs/interactions/alternatives discussed with patient.    Pt was given an after visit summary which includes diagnoses, current medications & vitals.  Pt expressed understanding with the diagnosis and plan

## 2021-08-12 LAB
ALBUMIN SERPL-MCNC: 3.9 G/DL (ref 3.5–5)
ALBUMIN/GLOB SERPL: 1 {RATIO} (ref 1.1–2.2)
ALP SERPL-CCNC: 54 U/L (ref 45–117)
ALT SERPL-CCNC: 20 U/L (ref 12–78)
ANION GAP SERPL CALC-SCNC: 5 MMOL/L (ref 5–15)
AST SERPL-CCNC: 13 U/L (ref 15–37)
BILIRUB SERPL-MCNC: 0.7 MG/DL (ref 0.2–1)
BUN SERPL-MCNC: 11 MG/DL (ref 6–20)
BUN/CREAT SERPL: 14 (ref 12–20)
CALCIUM SERPL-MCNC: 9 MG/DL (ref 8.5–10.1)
CHLORIDE SERPL-SCNC: 108 MMOL/L (ref 97–108)
CO2 SERPL-SCNC: 26 MMOL/L (ref 21–32)
CREAT SERPL-MCNC: 0.77 MG/DL (ref 0.55–1.02)
ERYTHROCYTE [DISTWIDTH] IN BLOOD BY AUTOMATED COUNT: 12.5 % (ref 11.5–14.5)
EST. AVERAGE GLUCOSE BLD GHB EST-MCNC: 105 MG/DL
GLOBULIN SER CALC-MCNC: 3.8 G/DL (ref 2–4)
GLUCOSE SERPL-MCNC: 71 MG/DL (ref 65–100)
HBA1C MFR BLD: 5.3 % (ref 4–5.6)
HCT VFR BLD AUTO: 38.5 % (ref 35–47)
HGB BLD-MCNC: 12.4 G/DL (ref 11.5–16)
MCH RBC QN AUTO: 28.2 PG (ref 26–34)
MCHC RBC AUTO-ENTMCNC: 32.2 G/DL (ref 30–36.5)
MCV RBC AUTO: 87.5 FL (ref 80–99)
NRBC # BLD: 0 K/UL (ref 0–0.01)
NRBC BLD-RTO: 0 PER 100 WBC
PLATELET # BLD AUTO: 233 K/UL (ref 150–400)
PMV BLD AUTO: 11.6 FL (ref 8.9–12.9)
POTASSIUM SERPL-SCNC: 3.9 MMOL/L (ref 3.5–5.1)
PROT SERPL-MCNC: 7.7 G/DL (ref 6.4–8.2)
RBC # BLD AUTO: 4.4 M/UL (ref 3.8–5.2)
SODIUM SERPL-SCNC: 139 MMOL/L (ref 136–145)
WBC # BLD AUTO: 7.9 K/UL (ref 3.6–11)

## 2022-03-19 PROBLEM — Q51.9 CONGENITAL UTERINE ANOMALY: Status: ACTIVE | Noted: 2020-11-16

## 2022-03-19 PROBLEM — G44.219 EPISODIC TENSION-TYPE HEADACHE, NOT INTRACTABLE: Status: ACTIVE | Noted: 2019-03-21

## 2022-03-19 PROBLEM — E66.01 SEVERE OBESITY (HCC): Status: ACTIVE | Noted: 2019-03-21

## 2022-07-08 ENCOUNTER — OFFICE VISIT (OUTPATIENT)
Dept: ORTHOPEDIC SURGERY | Age: 42
End: 2022-07-08
Payer: MEDICAID

## 2022-07-08 VITALS — HEIGHT: 72 IN | WEIGHT: 222 LBS | BODY MASS INDEX: 30.07 KG/M2

## 2022-07-08 DIAGNOSIS — M22.42 PATELLOFEMORAL CHONDROSIS OF LEFT KNEE: ICD-10-CM

## 2022-07-08 DIAGNOSIS — M22.42 CHONDROMALACIA OF LEFT PATELLOFEMORAL JOINT: Primary | ICD-10-CM

## 2022-07-08 DIAGNOSIS — M25.569 KNEE PAIN, UNSPECIFIED CHRONICITY, UNSPECIFIED LATERALITY: ICD-10-CM

## 2022-07-08 DIAGNOSIS — M22.41 PATELLOFEMORAL CHONDROSIS OF RIGHT KNEE: ICD-10-CM

## 2022-07-08 PROCEDURE — 99203 OFFICE O/P NEW LOW 30 MIN: CPT | Performed by: ORTHOPAEDIC SURGERY

## 2022-07-08 NOTE — PROGRESS NOTES
ASSESSMENT/PLAN:  Below is the assessment and plan developed based on review of pertinent history, physical exam, labs, studies, and medications. 1. Chondromalacia of left patellofemoral joint  2. Knee pain, unspecified chronicity, unspecified laterality  -     XR KNEE LT MIN 4 V; Future  3. Patellofemoral chondrosis of right knee  4. Patellofemoral chondrosis of left knee      Return in about 6 weeks (around 8/19/2022). In the interim, we have recommended ice, elevation, and to take prescription anti-inflammatory medications along with a physician directed home exercise program.  We discussed the risks and common side effects of anti-inflammatory medications and instructed the patient to discontinue the medication and contact us if they experience any side effects. The patient was encouraged to discuss possible side effects with her family physician or pharmacist prior to initiating any new medications. We discussed the fact that many of the recommended treatment options presented are significantly limited by the patient's social determinants of health. We also reviewed the circumstances surrounding the environment that they live and work which affected a wide range of health risks. We consider the limited access to appropriate educational resources regarding proper nutrition and exercise as well as the economic and social support necessary to maintain health and wellbeing. We discussed with the patient the nature and pathology of patellofemoral chondromalacia. We discussed the various treatment options that include over-the-counter anti-inflammatories, injections, physical therapy, and even surgical management. We discussed short arc quad exercises and avoiding any deep knee bend activities. We have discussed various low impact aerobic exercises that are helpful. We will have her work on the home exercise program that was discussed and have her follow-up for re-evaluation in 6 weeks. SUBJECTIVE/OBJECTIVE:  Lurdes Guillermo (: 1980) is a 40 y.o. female, patient here for evaluation of the Knee Pain (left)  . Patient presents today for evaluation of her left knee. She states that she started to have significant left knee pain yesterday without any known injury. She has had some issues in the left knee but also the right knee in the past especially with basketball. She denies any recent treatment for her knees. She states that squats and kneeling cause significant pain in her knees. She does note some clicking and popping in the knees. She denies pain that goes down the leg. She denies numbness or tingling. Physical Exam    General: Well-dressed well-nourished female no acute distress, alert and oriented x3    Left knee: Upon examination of the left knee, the patient is nontender to palpation along the medial and lateral joint lines, and has no effusion. They are tender to palpation along the medial and lateral facets of the patella. They have crepitus of the patellofemoral joint with range of motion and discomfort with patella grind testing. The patient has no discomfort with Abdiaziz´s maneuvers, and the knee is stable. They have full range of motion. They have 5/5 strength, and are neurovascularly intact distally. There is no erythema, warmth or skin lesions present. Imagin views of the left knee demonstrate some arthritic changes in the patellofemoral joint. She has good joint space maintained in the tibiofemoral joints. Allergies   Allergen Reactions    Latex Rash and Itching       Current Outpatient Medications   Medication Sig    cholecalciferol (VITAMIN D3) (1000 Units /25 mcg) tablet Take 1 Tablet by mouth daily.  cetirizine (ZYRTEC) 10 mg tablet Take 1 Tab by mouth daily.  multivitamin (ONE A DAY) tablet Take 1 Tablet by mouth daily.  levonorgestrel (MIRENA) 20 mcg/24 hr (5 years) IUD 1 Device by IntraUTERine route once.      No current facility-administered medications for this visit. Past Medical History:   Diagnosis Date    Anemia     During Pregnancy, taking Iron    Arcuate uterus 2018    Asthma     ventolin inhaler prn    Polycystic disease, ovaries     Previous  section 2018    Sickle cell trait syndrome (HCC)     carrier of the trait, FOB negative     Vitamin D deficiency 8/15/2012       Past Surgical History:   Procedure Laterality Date    HX  SECTION      HX OTHER SURGICAL      cornea transplant in Left eye ,     [place]  DELIVERY ONLY             Family History   Problem Relation Age of Onset    Hypertension Mother     Diabetes Mother     Heart Disease Mother         pacer    Stroke Mother     Hypertension Father     Hypertension Sister     Hypertension Brother     Diabetes Maternal Uncle        Social History     Socioeconomic History    Marital status: SINGLE     Spouse name: Not on file    Number of children: Not on file    Years of education: Not on file    Highest education level: Not on file   Occupational History    Not on file   Tobacco Use    Smoking status: Never Smoker    Smokeless tobacco: Never Used   Vaping Use    Vaping Use: Never used   Substance and Sexual Activity    Alcohol use: No    Drug use: No    Sexual activity: Yes     Partners: Male     Birth control/protection: None   Other Topics Concern    Not on file   Social History Narrative    , 3 kids at home     Social Determinants of Health     Financial Resource Strain:     Difficulty of Paying Living Expenses: Not on file   Food Insecurity:     Worried About Running Out of Food in the Last Year: Not on file    Beatrice of Food in the Last Year: Not on file   Transportation Needs:     Lack of Transportation (Medical): Not on file    Lack of Transportation (Non-Medical):  Not on file   Physical Activity:     Days of Exercise per Week: Not on file    Minutes of Exercise per Session: Not on file   Stress:     Feeling of Stress : Not on file   Social Connections:     Frequency of Communication with Friends and Family: Not on file    Frequency of Social Gatherings with Friends and Family: Not on file    Attends Yazdanism Services: Not on file    Active Member of Clubs or Organizations: Not on file    Attends Club or Organization Meetings: Not on file    Marital Status: Not on file   Intimate Partner Violence:     Fear of Current or Ex-Partner: Not on file    Emotionally Abused: Not on file    Physically Abused: Not on file    Sexually Abused: Not on file   Housing Stability:     Unable to Pay for Housing in the Last Year: Not on file    Number of [place] in the Last Year: Not on file    Unstable Housing in the Last Year: Not on file       Review of Systems    No flowsheet data found. Vitals:  Ht 6' 1\" (1.854 m)   Wt 222 lb (100.7 kg)   BMI 29.29 kg/m²    Body mass index is 29.29 kg/m². An electronic signature was used to authenticate this note.   -- Mya Chu MD

## 2023-05-15 ENCOUNTER — OFFICE VISIT (OUTPATIENT)
Age: 43
End: 2023-05-15
Payer: MEDICAID

## 2023-05-15 VITALS
BODY MASS INDEX: 32.94 KG/M2 | TEMPERATURE: 97.5 F | DIASTOLIC BLOOD PRESSURE: 86 MMHG | SYSTOLIC BLOOD PRESSURE: 128 MMHG | WEIGHT: 243.2 LBS | RESPIRATION RATE: 18 BRPM | HEART RATE: 85 BPM | OXYGEN SATURATION: 99 % | HEIGHT: 72 IN

## 2023-05-15 DIAGNOSIS — E55.9 VITAMIN D DEFICIENCY: ICD-10-CM

## 2023-05-15 DIAGNOSIS — J30.1 ALLERGIC RHINITIS DUE TO POLLEN, UNSPECIFIED SEASONALITY: ICD-10-CM

## 2023-05-15 DIAGNOSIS — Z11.59 NEED FOR HEPATITIS C SCREENING TEST: ICD-10-CM

## 2023-05-15 DIAGNOSIS — Z94.7 HISTORY OF CORNEAL TRANSPLANT: ICD-10-CM

## 2023-05-15 DIAGNOSIS — Z00.00 ROUTINE PHYSICAL EXAMINATION: Primary | ICD-10-CM

## 2023-05-15 DIAGNOSIS — Z11.3 SCREENING FOR STD (SEXUALLY TRANSMITTED DISEASE): ICD-10-CM

## 2023-05-15 DIAGNOSIS — D57.3 SICKLE CELL TRAIT (HCC): ICD-10-CM

## 2023-05-15 PROBLEM — G44.219 EPISODIC TENSION-TYPE HEADACHE, NOT INTRACTABLE: Status: RESOLVED | Noted: 2019-03-21 | Resolved: 2023-05-15

## 2023-05-15 PROBLEM — Q51.9 CONGENITAL UTERINE ANOMALY: Status: RESOLVED | Noted: 2020-11-16 | Resolved: 2023-05-15

## 2023-05-15 PROBLEM — E66.01 SEVERE OBESITY (HCC): Status: RESOLVED | Noted: 2019-03-21 | Resolved: 2023-05-15

## 2023-05-15 PROBLEM — Q51.9 CONGENITAL UTERINE ANOMALY: Status: ACTIVE | Noted: 2020-11-16

## 2023-05-15 PROCEDURE — 99386 PREV VISIT NEW AGE 40-64: CPT | Performed by: INTERNAL MEDICINE

## 2023-05-15 RX ORDER — FLUTICASONE PROPIONATE 50 MCG
1 SPRAY, SUSPENSION (ML) NASAL DAILY
COMMUNITY

## 2023-05-15 SDOH — ECONOMIC STABILITY: INCOME INSECURITY: HOW HARD IS IT FOR YOU TO PAY FOR THE VERY BASICS LIKE FOOD, HOUSING, MEDICAL CARE, AND HEATING?: NOT HARD AT ALL

## 2023-05-15 SDOH — ECONOMIC STABILITY: FOOD INSECURITY: WITHIN THE PAST 12 MONTHS, YOU WORRIED THAT YOUR FOOD WOULD RUN OUT BEFORE YOU GOT MONEY TO BUY MORE.: NEVER TRUE

## 2023-05-15 SDOH — ECONOMIC STABILITY: FOOD INSECURITY: WITHIN THE PAST 12 MONTHS, THE FOOD YOU BOUGHT JUST DIDN'T LAST AND YOU DIDN'T HAVE MONEY TO GET MORE.: NEVER TRUE

## 2023-05-15 SDOH — ECONOMIC STABILITY: HOUSING INSECURITY
IN THE LAST 12 MONTHS, WAS THERE A TIME WHEN YOU DID NOT HAVE A STEADY PLACE TO SLEEP OR SLEPT IN A SHELTER (INCLUDING NOW)?: NO

## 2023-05-15 ASSESSMENT — ENCOUNTER SYMPTOMS
DIARRHEA: 0
CONSTIPATION: 0
ABDOMINAL PAIN: 0
COUGH: 0
EYES NEGATIVE: 1
VOMITING: 0
SHORTNESS OF BREATH: 0
NAUSEA: 0

## 2023-05-15 ASSESSMENT — PATIENT HEALTH QUESTIONNAIRE - PHQ9
1. LITTLE INTEREST OR PLEASURE IN DOING THINGS: 0
SUM OF ALL RESPONSES TO PHQ QUESTIONS 1-9: 0
SUM OF ALL RESPONSES TO PHQ9 QUESTIONS 1 & 2: 0
2. FEELING DOWN, DEPRESSED OR HOPELESS: 0
SUM OF ALL RESPONSES TO PHQ QUESTIONS 1-9: 0

## 2023-05-15 NOTE — PROGRESS NOTES
Chief Complaint   Patient presents with    New Patient     Blood pressure 128/86, pulse 85, temperature 97.5 °F (36.4 °C), temperature source Temporal, resp. rate 18, height 6' 0.6\" (1.844 m), weight 243 lb 3.2 oz (110.3 kg), SpO2 99 %.
Negative for myalgias. Skin:  Negative for rash. Neurological:  Negative for dizziness and headaches. Hematological:  Does not bruise/bleed easily. Psychiatric/Behavioral:  Negative for dysphoric mood and sleep disturbance. The patient is not nervous/anxious. Objective:     Physical Exam  Constitutional:       Appearance: Normal appearance. She is not ill-appearing. HENT:      Head: Normocephalic and atraumatic. Right Ear: Tympanic membrane, ear canal and external ear normal. There is no impacted cerumen. Left Ear: Tympanic membrane, ear canal and external ear normal. There is no impacted cerumen. Nose: Nose normal. No congestion. Mouth/Throat:      Mouth: Mucous membranes are moist.      Pharynx: No oropharyngeal exudate. Eyes:      Pupils: Pupils are equal, round, and reactive to light. Cardiovascular:      Rate and Rhythm: Normal rate and regular rhythm. Heart sounds: No murmur heard. Pulmonary:      Effort: No respiratory distress. Breath sounds: No wheezing. Abdominal:      General: Bowel sounds are normal. There is no distension. Palpations: Abdomen is soft. Tenderness: There is no abdominal tenderness. Musculoskeletal:      Cervical back: Normal range of motion and neck supple. Right lower leg: No edema. Left lower leg: No edema. Lymphadenopathy:      Cervical: No cervical adenopathy. Skin:     General: Skin is warm. Neurological:      General: No focal deficit present. Mental Status: She is alert and oriented to person, place, and time. Mental status is at baseline. Psychiatric:         Mood and Affect: Mood normal.         Thought Content:  Thought content normal.         Judgment: Judgment normal.          Vitals:    05/15/23 1557   BP: 128/86   Pulse: 85   Resp: 18   Temp: 97.5 °F (36.4 °C)   TempSrc: Temporal   SpO2: 99%   Weight: 243 lb 3.2 oz (110.3 kg)   Height: 6' 0.6\" (1.844 m)        Disclaimer:  Aspects of this

## 2023-05-16 ENCOUNTER — TELEPHONE (OUTPATIENT)
Age: 43
End: 2023-05-16

## 2023-05-16 NOTE — TELEPHONE ENCOUNTER
Reason for call:  TC from pt. Pt id verified. Pt states she just saw Dr. Miranda Bustillo but failed to ask her what type of dr she needed to see for her hernia and if she could get some recommendations on who to see. Pt requesting to speak with a nurse to discuss options.      Best call back number: [phone]

## 2023-05-17 NOTE — TELEPHONE ENCOUNTER
Called pt, ID x 2. Advised pt per 's note regarding recommendation to discuss hernia. Pt verbalized understanding.

## 2023-05-17 NOTE — TELEPHONE ENCOUNTER
I dont recommend discussing it with her but if she is wondering about someone to see for an abdominal wall type hernia I recommend Dr Zak Shannon with the [id number] [facility] surgery group

## 2023-05-19 DIAGNOSIS — Z11.3 SCREENING FOR STD (SEXUALLY TRANSMITTED DISEASE): ICD-10-CM

## 2023-05-19 DIAGNOSIS — E55.9 VITAMIN D DEFICIENCY: ICD-10-CM

## 2023-05-19 DIAGNOSIS — Z11.59 NEED FOR HEPATITIS C SCREENING TEST: ICD-10-CM

## 2023-05-19 DIAGNOSIS — Z00.00 ROUTINE PHYSICAL EXAMINATION: ICD-10-CM

## 2023-05-20 LAB
25(OH)D3 SERPL-MCNC: 25.2 NG/ML (ref 30–100)
ALBUMIN SERPL-MCNC: 4.1 G/DL (ref 3.5–5)
ALBUMIN/GLOB SERPL: 1.1 (ref 1.1–2.2)
ALP SERPL-CCNC: 53 U/L (ref 45–117)
ALT SERPL-CCNC: 33 U/L (ref 12–78)
ANION GAP SERPL CALC-SCNC: 4 MMOL/L (ref 5–15)
AST SERPL-CCNC: 22 U/L (ref 15–37)
BASOPHILS # BLD: 0 K/UL (ref 0–0.1)
BASOPHILS NFR BLD: 0 % (ref 0–1)
BILIRUB SERPL-MCNC: 0.7 MG/DL (ref 0.2–1)
BUN SERPL-MCNC: 9 MG/DL (ref 6–20)
BUN/CREAT SERPL: 10 (ref 12–20)
CALCIUM SERPL-MCNC: 9.2 MG/DL (ref 8.5–10.1)
CHLORIDE SERPL-SCNC: 108 MMOL/L (ref 97–108)
CHOLEST SERPL-MCNC: 202 MG/DL
CO2 SERPL-SCNC: 25 MMOL/L (ref 21–32)
CREAT SERPL-MCNC: 0.86 MG/DL (ref 0.55–1.02)
DIFFERENTIAL METHOD BLD: NORMAL
EOSINOPHIL # BLD: 0.1 K/UL (ref 0–0.4)
EOSINOPHIL NFR BLD: 2 % (ref 0–7)
ERYTHROCYTE [DISTWIDTH] IN BLOOD BY AUTOMATED COUNT: 12.2 % (ref 11.5–14.5)
EST. AVERAGE GLUCOSE BLD GHB EST-MCNC: 108 MG/DL
GLOBULIN SER CALC-MCNC: 3.7 G/DL (ref 2–4)
GLUCOSE SERPL-MCNC: 89 MG/DL (ref 65–100)
HBA1C MFR BLD: 5.4 % (ref 4–5.6)
HCT VFR BLD AUTO: 40 % (ref 35–47)
HDLC SERPL-MCNC: 52 MG/DL
HDLC SERPL: 3.9 (ref 0–5)
HGB BLD-MCNC: 12.5 G/DL (ref 11.5–16)
HIV 1+2 AB+HIV1 P24 AG SERPL QL IA: NONREACTIVE
HIV 1/2 RESULT COMMENT: NORMAL
IMM GRANULOCYTES # BLD AUTO: 0 K/UL (ref 0–0.04)
IMM GRANULOCYTES NFR BLD AUTO: 0 % (ref 0–0.5)
LDLC SERPL CALC-MCNC: 135.6 MG/DL (ref 0–100)
LYMPHOCYTES # BLD: 1.9 K/UL (ref 0.8–3.5)
LYMPHOCYTES NFR BLD: 25 % (ref 12–49)
MCH RBC QN AUTO: 27.5 PG (ref 26–34)
MCHC RBC AUTO-ENTMCNC: 31.3 G/DL (ref 30–36.5)
MCV RBC AUTO: 87.9 FL (ref 80–99)
MONOCYTES # BLD: 0.7 K/UL (ref 0–1)
MONOCYTES NFR BLD: 9 % (ref 5–13)
NEUTS SEG # BLD: 4.8 K/UL (ref 1.8–8)
NEUTS SEG NFR BLD: 64 % (ref 32–75)
NRBC # BLD: 0 K/UL (ref 0–0.01)
NRBC BLD-RTO: 0 PER 100 WBC
PLATELET # BLD AUTO: 232 K/UL (ref 150–400)
PMV BLD AUTO: 11.5 FL (ref 8.9–12.9)
POTASSIUM SERPL-SCNC: 3.8 MMOL/L (ref 3.5–5.1)
PROT SERPL-MCNC: 7.8 G/DL (ref 6.4–8.2)
RBC # BLD AUTO: 4.55 M/UL (ref 3.8–5.2)
SODIUM SERPL-SCNC: 137 MMOL/L (ref 136–145)
TRIGL SERPL-MCNC: 72 MG/DL
TSH SERPL DL<=0.05 MIU/L-ACNC: 1 UIU/ML (ref 0.36–3.74)
VLDLC SERPL CALC-MCNC: 14.4 MG/DL
WBC # BLD AUTO: 7.5 K/UL (ref 3.6–11)

## 2023-05-22 LAB
HCV AB SERPL QL IA: NORMAL
HCV IGG SERPL QL IA: NON REACTIVE S/CO RATIO

## 2023-05-31 ENCOUNTER — NURSE TRIAGE (OUTPATIENT)
Dept: OTHER | Facility: CLINIC | Age: 43
End: 2023-05-31

## 2023-05-31 ENCOUNTER — TELEPHONE (OUTPATIENT)
Age: 43
End: 2023-05-31

## 2023-05-31 NOTE — TELEPHONE ENCOUNTER
Patient called in and stated that she wanted to speak with a manager of the practice regarding her appointment today. I told patient that one was not available currently but I would send a message and have them return the call. Patient stated that she was only 10 minutes late for her appointment and was told that she needed to reschedule but it was someone else fault that she was late due to them telling her the incorrect place to go. Patient then stated that even If he she was past her appointment time she could have been checked in to fill out paper work because she is a new patient and would not be called back into the room right at 6. I told patient that I understand her being frustrated and am sorry that the provider had said that she needed to reschedule. Patient stated that this was bs and wanted to go ahead and leave a message for a manager. I got patients call back phone number and she wanted to know when she would be hearing from someone at the office. I told her that I did not know an exact time but I would assume sometime today. Patient then asked if Dr. Selma Ravi had any other appointments today and I told her that he did not. Patient then hung up the phone on me.

## 2023-05-31 NOTE — TELEPHONE ENCOUNTER
Location of patient: [address] wiggins from [facility] with [employer]. Subjective: Caller states \"fell down steps today, left arm is hurting\"     Current Symptoms: Left arm injury today. [place] down about 6 steps  Gets pain when making a fist.  Also pain in back. Is supposed to drive and work, needs to be seen to get excused from driving. No swelling or bruising yet. Onset: today     Associated Symptoms: NA    Pain Severity: pain when using pain, not severe    Temperature:      What has been tried: nothing    LMP: NA Pregnant: NA    Recommended disposition: See in Office Today    Care advice provided, patient verbalizes understanding; denies any other questions or concerns; instructed to call back for any new or worsening symptoms. Patient/Caller agrees with recommended disposition; writer provided warm transfer to [facility] for appointment scheduling    Attention Provider: Thank you for allowing me to participate in the care of your patient. The patient was connected to triage in response to information provided to the ECC/PSC. Please do not respond through this encounter as the response is not directed to a shared pool.     Reason for Disposition   Can't move injured arm normally (bend or straighten completely)    Protocols used: Arm Injury-ADULT-[place]

## 2023-05-31 NOTE — TELEPHONE ENCOUNTER
Patient had an appointment today at 11am but at 11:12 patient walked in and she was already red dotted. I told patient to let me call the nurse and make sure we could still check her in since she was late. Patient stated she was late because someone in the hospital gave her the wrong directions. I called the [facility] and was told Dr. Hannah Soto red dotted it because she was already late and would need to reschedule. I told the patient that the Doctor wanted to have her reschedule. Patient stated that this was bs and walked out without rescheduling.

## 2023-06-02 ENCOUNTER — OFFICE VISIT (OUTPATIENT)
Age: 43
End: 2023-06-02
Payer: MEDICAID

## 2023-06-02 VITALS
BODY MASS INDEX: 32.48 KG/M2 | SYSTOLIC BLOOD PRESSURE: 129 MMHG | OXYGEN SATURATION: 99 % | HEIGHT: 72 IN | WEIGHT: 239.8 LBS | DIASTOLIC BLOOD PRESSURE: 91 MMHG | TEMPERATURE: 97.5 F | RESPIRATION RATE: 16 BRPM | HEART RATE: 77 BPM
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
## 2023-06-02 DIAGNOSIS — S39.011A STRAIN OF ABDOMINAL MUSCLE, INITIAL ENCOUNTER: ICD-10-CM

## 2023-06-02 DIAGNOSIS — S39.012A BACK STRAIN, INITIAL ENCOUNTER: ICD-10-CM

## 2023-06-02 DIAGNOSIS — M25.532 LEFT WRIST PAIN: Primary | ICD-10-CM

## 2023-06-02 PROCEDURE — 99213 OFFICE O/P EST LOW 20 MIN: CPT | Performed by: NURSE PRACTITIONER

## 2023-06-02 ASSESSMENT — PATIENT HEALTH QUESTIONNAIRE - PHQ9
SUM OF ALL RESPONSES TO PHQ QUESTIONS 1-9: 0
SUM OF ALL RESPONSES TO PHQ9 QUESTIONS 1 & 2: 0
2. FEELING DOWN, DEPRESSED OR HOPELESS: 0
1. LITTLE INTEREST OR PLEASURE IN DOING THINGS: 0
SUM OF ALL RESPONSES TO PHQ QUESTIONS 1-9: 0

## 2023-06-02 ASSESSMENT — ENCOUNTER SYMPTOMS: BACK PAIN: 1

## 2023-06-02 NOTE — PROGRESS NOTES
Adelso Grace (:  1980) is a 44 y.o. female,here for evaluation of the following chief complaint(s):  Fall (Left arm injured.)        SUBJECTIVE/OBJECTIVE:    HPI:Patient presents for abdominal pain, right-sided low back pain, and left wrist pain after falling down her stairs 3 days ago. No head trauma. No LOC. No bruising, but mild swelling of left wrist. She has applied ice. She has not taken anything for pain. Pain is worse with movements. Bowel movements are normal. No hematuria. Review of Systems   Musculoskeletal:  Positive for back pain. Left wrist pain     Physical Exam  Constitutional:       Appearance: Normal appearance. Abdominal:      General: Abdomen is flat. Hernia: A hernia is present. Hernia is present in the ventral area (not new; reducible). Comments: Mild lower abdominal discomfort with deep palpation and movement; no mass   Musculoskeletal:      Comments: Right lower latissimus dorsi discomfort with palpation and movement; no bruising or swelling   Skin:     General: Skin is warm and dry. Neurological:      General: No focal deficit present. Mental Status: She is alert and oriented to person, place, and time. Psychiatric:         Mood and Affect: Mood normal.         Behavior: Behavior normal.        ASSESSMENT/PLAN:  1. Left wrist pain  -     XR WRIST LEFT (2 VIEWS); Future  2. Fall down stairs, initial encounter  3. Strain of abdominal muscle, initial encounter  4.  Back strain, initial encounter  OTC Nsaids for pain  Xray of left wrist, continue with ice application x 20 minutes    --EDWIN Stack NP

## 2023-06-05 ENCOUNTER — OFFICE VISIT (OUTPATIENT)
Age: 43
End: 2023-06-05
Payer: MEDICAID

## 2023-06-05 VITALS
HEART RATE: 78 BPM | DIASTOLIC BLOOD PRESSURE: 69 MMHG | RESPIRATION RATE: 18 BRPM | HEIGHT: 72 IN | TEMPERATURE: 98.6 F | OXYGEN SATURATION: 96 % | BODY MASS INDEX: 32.51 KG/M2 | SYSTOLIC BLOOD PRESSURE: 113 MMHG | WEIGHT: 240 LBS

## 2023-06-05 DIAGNOSIS — K43.9 VENTRAL HERNIA WITHOUT OBSTRUCTION OR GANGRENE: Primary | ICD-10-CM

## 2023-06-05 PROCEDURE — 99203 OFFICE O/P NEW LOW 30 MIN: CPT | Performed by: SURGERY

## 2023-06-05 ASSESSMENT — ANXIETY QUESTIONNAIRES
IF YOU CHECKED OFF ANY PROBLEMS ON THIS QUESTIONNAIRE, HOW DIFFICULT HAVE THESE PROBLEMS MADE IT FOR YOU TO DO YOUR WORK, TAKE CARE OF THINGS AT HOME, OR GET ALONG WITH OTHER PEOPLE: NOT DIFFICULT AT ALL
5. BEING SO RESTLESS THAT IT IS HARD TO SIT STILL: 0
6. BECOMING EASILY ANNOYED OR IRRITABLE: 0
7. FEELING AFRAID AS IF SOMETHING AWFUL MIGHT HAPPEN: 0
2. NOT BEING ABLE TO STOP OR CONTROL WORRYING: 0
4. TROUBLE RELAXING: 0
1. FEELING NERVOUS, ANXIOUS, OR ON EDGE: 0
GAD7 TOTAL SCORE: 0
3. WORRYING TOO MUCH ABOUT DIFFERENT THINGS: 0

## 2023-06-05 ASSESSMENT — PATIENT HEALTH QUESTIONNAIRE - PHQ9
SUM OF ALL RESPONSES TO PHQ QUESTIONS 1-9: 0
SUM OF ALL RESPONSES TO PHQ QUESTIONS 1-9: 0
SUM OF ALL RESPONSES TO PHQ9 QUESTIONS 1 & 2: 0
SUM OF ALL RESPONSES TO PHQ QUESTIONS 1-9: 0
2. FEELING DOWN, DEPRESSED OR HOPELESS: 0
1. LITTLE INTEREST OR PLEASURE IN DOING THINGS: 0
SUM OF ALL RESPONSES TO PHQ QUESTIONS 1-9: 0

## 2023-06-05 NOTE — PROGRESS NOTES
[facility] at [facility] Surgery History and Physical    History of Present Illness:      Michael Wong is a 44 y.o. female who has an abdominal wall hernia. She has noticed a lump in the upper midline maybe a little bit more to the right side. She noticed this after being pregnant a number of years ago. She has minimal if any pain at the site. She thinks maybe is gotten a little bit bigger. She has been having normal bowel movements no nausea or vomiting.   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 ,          Current Outpatient Medications:     Omega-3 Fatty Acids (OMEGA-3 EPA FISH OIL PO), Take by mouth, Disp: , Rfl:     fluticasone (FLONASE) 50 MCG/ACT nasal spray, 1 spray by Each Nostril route daily, Disp: , Rfl:     cetirizine (ZYRTEC) 10 MG tablet, Take 1 tablet by mouth daily, Disp: , Rfl:     vitamin D (CHOLECALCIFEROL) 25 MCG (1000 UT) TABS tablet, Take 1 tablet by mouth daily, Disp: , Rfl:     levonorgestrel (MIRENA) IUD 52 mg, 1 Device by IntraUTERine route once, Disp: , Rfl:     Allergies   Allergen Reactions    Latex Itching and Rash       Social History     Socioeconomic History    Marital status: Single     Spouse name: Not on file    Number of children: Not on file    Years of education: Not on file    Highest education level: Not on file   Occupational History    Not on file   Tobacco Use    Smoking

## 2023-06-05 NOTE — PROGRESS NOTES
Identified patient with two patient identifiers (name and ). Reviewed chart in preparation for visit and have obtained necessary documentation. Nate Gipson is a 44 y.o. female  Chief Complaint   Patient presents with    New Patient     Abdominal wall hernia     /69 (Site: Left Upper Arm, Position: Sitting, Cuff Size: Medium Adult)   Pulse 78   Temp 98.6 °F (37 °C) (Oral)   Resp 18   Ht 6' (1.829 m)   Wt 240 lb (108.9 kg)   LMP  (LMP Unknown)   SpO2 96%   BMI 32.55 kg/m²     1. Have you been to the ER, urgent care clinic since your last visit? Hospitalized since your last visit?no    2. Have you seen or consulted any other health care providers outside of the [address] [place] [address] since your last visit? Include any pap smears or colon screening.  no

## 2024-08-09 ENCOUNTER — OFFICE VISIT (OUTPATIENT)
Age: 44
End: 2024-08-09
Payer: COMMERCIAL

## 2024-08-09 VITALS
HEIGHT: 72 IN | SYSTOLIC BLOOD PRESSURE: 138 MMHG | TEMPERATURE: 97.5 F | OXYGEN SATURATION: 98 % | WEIGHT: 246.5 LBS | HEART RATE: 78 BPM | BODY MASS INDEX: 33.39 KG/M2 | DIASTOLIC BLOOD PRESSURE: 84 MMHG | RESPIRATION RATE: 16 BRPM

## 2024-08-09 DIAGNOSIS — Z00.00 ROUTINE PHYSICAL EXAMINATION: Primary | ICD-10-CM

## 2024-08-09 DIAGNOSIS — E55.9 VITAMIN D DEFICIENCY: ICD-10-CM

## 2024-08-09 DIAGNOSIS — E66.09 CLASS 1 OBESITY DUE TO EXCESS CALORIES WITHOUT SERIOUS COMORBIDITY WITH BODY MASS INDEX (BMI) OF 32.0 TO 32.9 IN ADULT: ICD-10-CM

## 2024-08-09 DIAGNOSIS — Z94.7 HISTORY OF CORNEAL TRANSPLANT: ICD-10-CM

## 2024-08-09 PROBLEM — E66.811 CLASS 1 OBESITY DUE TO EXCESS CALORIES WITHOUT SERIOUS COMORBIDITY WITH BODY MASS INDEX (BMI) OF 32.0 TO 32.9 IN ADULT: Status: ACTIVE | Noted: 2024-08-09

## 2024-08-09 PROCEDURE — 99396 PREV VISIT EST AGE 40-64: CPT | Performed by: INTERNAL MEDICINE

## 2024-08-09 RX ORDER — ACETAMINOPHEN 160 MG
TABLET,DISINTEGRATING ORAL
COMMUNITY

## 2024-08-09 SDOH — ECONOMIC STABILITY: FOOD INSECURITY: WITHIN THE PAST 12 MONTHS, THE FOOD YOU BOUGHT JUST DIDN'T LAST AND YOU DIDN'T HAVE MONEY TO GET MORE.: NEVER TRUE

## 2024-08-09 SDOH — ECONOMIC STABILITY: FOOD INSECURITY: WITHIN THE PAST 12 MONTHS, YOU WORRIED THAT YOUR FOOD WOULD RUN OUT BEFORE YOU GOT MONEY TO BUY MORE.: NEVER TRUE

## 2024-08-09 SDOH — ECONOMIC STABILITY: INCOME INSECURITY: HOW HARD IS IT FOR YOU TO PAY FOR THE VERY BASICS LIKE FOOD, HOUSING, MEDICAL CARE, AND HEATING?: NOT HARD AT ALL

## 2024-08-09 ASSESSMENT — PATIENT HEALTH QUESTIONNAIRE - PHQ9
SUM OF ALL RESPONSES TO PHQ QUESTIONS 1-9: 0
2. FEELING DOWN, DEPRESSED OR HOPELESS: NOT AT ALL
1. LITTLE INTEREST OR PLEASURE IN DOING THINGS: NOT AT ALL
SUM OF ALL RESPONSES TO PHQ9 QUESTIONS 1 & 2: 0
SUM OF ALL RESPONSES TO PHQ QUESTIONS 1-9: 0

## 2024-08-09 ASSESSMENT — ENCOUNTER SYMPTOMS
NAUSEA: 0
EYES NEGATIVE: 1
CONSTIPATION: 0
VOMITING: 0
COUGH: 0
DIARRHEA: 0
ABDOMINAL PAIN: 0
SHORTNESS OF BREATH: 0

## 2024-08-09 NOTE — PROGRESS NOTES
8/9/2024    Meredith Hill is a 44 y.o. female who was seen in clinic today for a full physical.             Assessment & Plan:   Below is the assessment and plan developed based on review of pertinent history, physical exam, labs, studies, and medications.    1. Routine physical examination  -     Lipid Panel; Future  -     Hemoglobin A1C; Future  -     TSH; Future  -     Comprehensive Metabolic Panel; Future  -     CBC with Auto Differential; Future  2. Class 1 obesity due to excess calories without serious comorbidity with body mass index (BMI) of 32.0 to 32.9 in adult  Assessment & Plan:  Controlled with current regimen, plan to continue   3. Vitamin D deficiency  -     Vitamin D 25 Hydroxy; Future  4. History of corneal transplant  Assessment & Plan:  Going for procedure on her other eye, history of keratoconus  Dr Corrigan  Patient in good health clinically and stable for upcoming surgery without need for further testing     BP borderline today- suggest getting a machine at home to monitor, alter me if consistently >140    Return for annual physical,or sooner as needed.      Meredith is here today for a full physical.      Diet and exercise habits: walking, wants to add more formal workouts into her routine    Depression Screen:  PHQ-9 Total Score: 0 (8/9/2024  9:02 AM)       Health Maintenance:     Health Maintenance   Topic Date Due    Flu vaccine (1) 08/01/2024    COVID-19 Vaccine (1) 01/01/2050 (Originally 1980)    Hepatitis B vaccine (1 of 3 - 3-dose series) 01/01/2060 (Originally 1980)    Breast cancer screen  03/08/2025    Depression Screen  08/09/2025    Cervical cancer screen  03/01/2026    Lipids  05/19/2028    DTaP/Tdap/Td vaccine (2 - Td or Tdap) 06/06/2028    Hepatitis C screen  Completed    HIV screen  Completed    Hepatitis A vaccine  Aged Out    Hib vaccine  Aged Out    HPV vaccine  Aged Out    Polio vaccine  Aged Out    Meningococcal (ACWY) vaccine  Aged Out    Pneumococcal 0-64 years

## 2024-08-09 NOTE — ASSESSMENT & PLAN NOTE
Going for procedure on her other eye, history of keratoconus  Dr Corrigan  Patient in good health clinically and stable for upcoming surgery without need for further testing

## 2024-08-16 DIAGNOSIS — E55.9 VITAMIN D DEFICIENCY: ICD-10-CM

## 2024-08-16 DIAGNOSIS — Z00.00 ROUTINE PHYSICAL EXAMINATION: ICD-10-CM

## 2024-08-16 LAB
25(OH)D3 SERPL-MCNC: 28.7 NG/ML (ref 30–100)
ALBUMIN SERPL-MCNC: 3.7 G/DL (ref 3.5–5)
ALBUMIN/GLOB SERPL: 1 (ref 1.1–2.2)
ALP SERPL-CCNC: 53 U/L (ref 45–117)
ALT SERPL-CCNC: 23 U/L (ref 12–78)
ANION GAP SERPL CALC-SCNC: 2 MMOL/L (ref 5–15)
AST SERPL-CCNC: 15 U/L (ref 15–37)
BASOPHILS # BLD: 0 K/UL (ref 0–0.1)
BASOPHILS NFR BLD: 0 % (ref 0–1)
BILIRUB SERPL-MCNC: 0.9 MG/DL (ref 0.2–1)
BUN SERPL-MCNC: 9 MG/DL (ref 6–20)
BUN/CREAT SERPL: 10 (ref 12–20)
CALCIUM SERPL-MCNC: 9.6 MG/DL (ref 8.5–10.1)
CHLORIDE SERPL-SCNC: 108 MMOL/L (ref 97–108)
CHOLEST SERPL-MCNC: 185 MG/DL
CO2 SERPL-SCNC: 28 MMOL/L (ref 21–32)
CREAT SERPL-MCNC: 0.94 MG/DL (ref 0.55–1.02)
DIFFERENTIAL METHOD BLD: ABNORMAL
EOSINOPHIL # BLD: 0.3 K/UL (ref 0–0.4)
EOSINOPHIL NFR BLD: 3 % (ref 0–7)
ERYTHROCYTE [DISTWIDTH] IN BLOOD BY AUTOMATED COUNT: 12.2 % (ref 11.5–14.5)
EST. AVERAGE GLUCOSE BLD GHB EST-MCNC: 108 MG/DL
GLOBULIN SER CALC-MCNC: 3.8 G/DL (ref 2–4)
GLUCOSE SERPL-MCNC: 86 MG/DL (ref 65–100)
HBA1C MFR BLD: 5.4 % (ref 4–5.6)
HCT VFR BLD AUTO: 37.3 % (ref 35–47)
HDLC SERPL-MCNC: 43 MG/DL
HDLC SERPL: 4.3 (ref 0–5)
HGB BLD-MCNC: 12.1 G/DL (ref 11.5–16)
IMM GRANULOCYTES # BLD AUTO: 0 K/UL (ref 0–0.04)
IMM GRANULOCYTES NFR BLD AUTO: 1 % (ref 0–0.5)
LDLC SERPL CALC-MCNC: 122 MG/DL (ref 0–100)
LYMPHOCYTES # BLD: 2 K/UL (ref 0.8–3.5)
LYMPHOCYTES NFR BLD: 26 % (ref 12–49)
MCH RBC QN AUTO: 27.5 PG (ref 26–34)
MCHC RBC AUTO-ENTMCNC: 32.4 G/DL (ref 30–36.5)
MCV RBC AUTO: 84.8 FL (ref 80–99)
MONOCYTES # BLD: 0.9 K/UL (ref 0–1)
MONOCYTES NFR BLD: 11 % (ref 5–13)
NEUTS SEG # BLD: 4.4 K/UL (ref 1.8–8)
NEUTS SEG NFR BLD: 59 % (ref 32–75)
NRBC # BLD: 0 K/UL (ref 0–0.01)
NRBC BLD-RTO: 0 PER 100 WBC
PLATELET # BLD AUTO: 213 K/UL (ref 150–400)
PMV BLD AUTO: 11.5 FL (ref 8.9–12.9)
POTASSIUM SERPL-SCNC: 4.2 MMOL/L (ref 3.5–5.1)
PROT SERPL-MCNC: 7.5 G/DL (ref 6.4–8.2)
RBC # BLD AUTO: 4.4 M/UL (ref 3.8–5.2)
SODIUM SERPL-SCNC: 138 MMOL/L (ref 136–145)
TRIGL SERPL-MCNC: 100 MG/DL
TSH SERPL DL<=0.05 MIU/L-ACNC: 1.41 UIU/ML (ref 0.36–3.74)
VLDLC SERPL CALC-MCNC: 20 MG/DL
WBC # BLD AUTO: 7.6 K/UL (ref 3.6–11)

## 2024-11-06 ENCOUNTER — OFFICE VISIT (OUTPATIENT)
Age: 44
End: 2024-11-06
Payer: COMMERCIAL

## 2024-11-06 VITALS
BODY MASS INDEX: 33 KG/M2 | HEIGHT: 72 IN | DIASTOLIC BLOOD PRESSURE: 85 MMHG | TEMPERATURE: 97.4 F | OXYGEN SATURATION: 98 % | SYSTOLIC BLOOD PRESSURE: 126 MMHG | WEIGHT: 243.6 LBS | RESPIRATION RATE: 16 BRPM | HEART RATE: 75 BPM

## 2024-11-06 DIAGNOSIS — Z01.818 PRE-OP EXAM: Primary | ICD-10-CM

## 2024-11-06 PROCEDURE — 99214 OFFICE O/P EST MOD 30 MIN: CPT | Performed by: NURSE PRACTITIONER

## 2024-11-06 ASSESSMENT — PATIENT HEALTH QUESTIONNAIRE - PHQ9
SUM OF ALL RESPONSES TO PHQ QUESTIONS 1-9: 0
1. LITTLE INTEREST OR PLEASURE IN DOING THINGS: NOT AT ALL
SUM OF ALL RESPONSES TO PHQ9 QUESTIONS 1 & 2: 0
SUM OF ALL RESPONSES TO PHQ QUESTIONS 1-9: 0
2. FEELING DOWN, DEPRESSED OR HOPELESS: NOT AT ALL

## 2024-11-06 NOTE — PROGRESS NOTES
Date of Exam: 2024    Meredith Hill is a 44 y.o. female (:1980) who presents for preoperative evaluation.   Procedure/Surgery:right corneal transplant  Date of Procedure/Surgery: 24  Surgeon: [facility]/Surgical Facility: [facility]  Primary Physician: Mey Harris MD  Latex Allergy: Yes    Current Outpatient Medications   Medication Sig Dispense Refill    Multiple Vitamins-Minerals (MULTIVITAL PO) Take by mouth daily      Cholecalciferol (VITAMIN D3) 50 MCG (2000 UT) CAPS Take by mouth      fluticasone (FLONASE) 50 MCG/ACT nasal spray 1 spray by Each Nostril route daily      cetirizine (ZYRTEC) 10 MG tablet Take 1 tablet by mouth daily      levonorgestrel (MIRENA) IUD 52 mg 1 Device by IntraUTERine route once       No current facility-administered medications for this visit.        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    EXAM:   /85   Pulse 75   Temp 97.4 °F (36.3 °C) (Temporal)   Resp 16   Ht 1.829 m (6')   Wt 110.5 kg (243 lb 9.6 oz)   SpO2 98%   BMI 33.04 kg/m²     Physical Examination: General appearance - alert, well appearing, and in no

## 2025-02-04 ENCOUNTER — OFFICE VISIT (OUTPATIENT)
Age: 45
End: 2025-02-04
Payer: COMMERCIAL

## 2025-02-04 VITALS
TEMPERATURE: 98.6 F | SYSTOLIC BLOOD PRESSURE: 134 MMHG | OXYGEN SATURATION: 94 % | HEIGHT: 72 IN | BODY MASS INDEX: 32.91 KG/M2 | RESPIRATION RATE: 16 BRPM | HEART RATE: 105 BPM | WEIGHT: 243 LBS | DIASTOLIC BLOOD PRESSURE: 92 MMHG

## 2025-02-04 DIAGNOSIS — J45.21 MILD INTERMITTENT ASTHMA WITH ACUTE EXACERBATION: Primary | ICD-10-CM

## 2025-02-04 PROCEDURE — 99213 OFFICE O/P EST LOW 20 MIN: CPT | Performed by: NURSE PRACTITIONER

## 2025-02-04 RX ORDER — BLOOD-GLUCOSE METER
KIT MISCELLANEOUS
Qty: 1 EACH | Refills: 0 | Status: SHIPPED | OUTPATIENT
Start: 2025-02-04

## 2025-02-04 RX ORDER — PREDNISOLONE ACETATE 10 MG/ML
SUSPENSION/ DROPS OPHTHALMIC
COMMUNITY
Start: 2025-01-11

## 2025-02-04 RX ORDER — BUDESONIDE AND FORMOTEROL FUMARATE DIHYDRATE 160; 4.5 UG/1; UG/1
2 AEROSOL RESPIRATORY (INHALATION) 2 TIMES DAILY
Qty: 10.2 G | Refills: 3 | Status: SHIPPED | OUTPATIENT
Start: 2025-02-04

## 2025-02-04 RX ORDER — ALBUTEROL SULFATE 0.83 MG/ML
2.5 SOLUTION RESPIRATORY (INHALATION) 4 TIMES DAILY PRN
Qty: 120 EACH | Refills: 3 | Status: SHIPPED | OUTPATIENT
Start: 2025-02-04

## 2025-02-04 RX ORDER — PREDNISONE 10 MG/1
TABLET ORAL
Qty: 30 TABLET | Refills: 0 | Status: SHIPPED | OUTPATIENT
Start: 2025-02-04

## 2025-02-04 RX ORDER — ALBUTEROL SULFATE 0.63 MG/3ML
1 SOLUTION RESPIRATORY (INHALATION) EVERY 6 HOURS PRN
COMMUNITY

## 2025-02-04 ASSESSMENT — PATIENT HEALTH QUESTIONNAIRE - PHQ9
SUM OF ALL RESPONSES TO PHQ QUESTIONS 1-9: 0
SUM OF ALL RESPONSES TO PHQ9 QUESTIONS 1 & 2: 0
SUM OF ALL RESPONSES TO PHQ QUESTIONS 1-9: 0
2. FEELING DOWN, DEPRESSED OR HOPELESS: NOT AT ALL
SUM OF ALL RESPONSES TO PHQ QUESTIONS 1-9: 0
SUM OF ALL RESPONSES TO PHQ QUESTIONS 1-9: 0
1. LITTLE INTEREST OR PLEASURE IN DOING THINGS: NOT AT ALL

## 2025-02-04 ASSESSMENT — ENCOUNTER SYMPTOMS
SHORTNESS OF BREATH: 1
WHEEZING: 1
COUGH: 1

## 2025-02-04 NOTE — PROGRESS NOTES
Meredith Hill (:  1980) is a 44 y.o. female,here for evaluation of the following chief complaint(s):  Asthma    BP (!) 134/92   Pulse (!) 105   Temp 98.6 °F (37 °C) (Temporal)   Resp 16   Ht 1.829 m (6')   Wt 110.2 kg (243 lb)   SpO2 94%   BMI 32.96 kg/m²       SUBJECTIVE/OBJECTIVE:    HPI:Patient reports asthma flare started 1 month ago. She recently got a new dog about 2 months ago. She is taking flonase and zyrtec. She has also used albuterol with little relief. She is wheezing, coughing, and short of breath.    Review of Systems   Respiratory:  Positive for cough, shortness of breath and wheezing.        Physical Exam  Constitutional:       Appearance: Normal appearance.   Cardiovascular:      Rate and Rhythm: Normal rate and regular rhythm.   Pulmonary:      Breath sounds: Wheezing (scattered throughout) present.   Musculoskeletal:         General: Normal range of motion.   Skin:     General: Skin is warm and dry.   Neurological:      General: No focal deficit present.      Mental Status: She is alert and oriented to person, place, and time.   Psychiatric:         Mood and Affect: Mood normal.         Behavior: Behavior normal.          ASSESSMENT/PLAN:  1. Mild intermittent asthma with acute exacerbation  Prednisone taper  Albuterol nebs every 4-6 hours as needed  Steroid Inhaler ordered  CXR if no improvement    --EDWIN Dill - NP

## 2025-07-24 RX ORDER — BUDESONIDE AND FORMOTEROL FUMARATE DIHYDRATE 160; 4.5 UG/1; UG/1
2 AEROSOL RESPIRATORY (INHALATION) 2 TIMES DAILY
Qty: 10.2 G | Refills: 3 | Status: SHIPPED | OUTPATIENT
Start: 2025-07-24

## 2025-08-22 ENCOUNTER — TELEPHONE (OUTPATIENT)
Age: 45
End: 2025-08-22

## 2025-08-22 ENCOUNTER — OFFICE VISIT (OUTPATIENT)
Age: 45
End: 2025-08-22
Payer: COMMERCIAL

## 2025-08-22 VITALS
TEMPERATURE: 98.2 F | DIASTOLIC BLOOD PRESSURE: 88 MMHG | SYSTOLIC BLOOD PRESSURE: 128 MMHG | WEIGHT: 246 LBS | RESPIRATION RATE: 16 BRPM | HEIGHT: 71 IN | OXYGEN SATURATION: 97 % | BODY MASS INDEX: 34.44 KG/M2 | HEART RATE: 96 BPM

## 2025-08-22 DIAGNOSIS — E66.811 CLASS 1 OBESITY DUE TO EXCESS CALORIES WITHOUT SERIOUS COMORBIDITY WITH BODY MASS INDEX (BMI) OF 32.0 TO 32.9 IN ADULT: ICD-10-CM

## 2025-08-22 DIAGNOSIS — Z00.00 ROUTINE PHYSICAL EXAMINATION: Primary | ICD-10-CM

## 2025-08-22 DIAGNOSIS — E66.09 CLASS 1 OBESITY DUE TO EXCESS CALORIES WITHOUT SERIOUS COMORBIDITY WITH BODY MASS INDEX (BMI) OF 32.0 TO 32.9 IN ADULT: ICD-10-CM

## 2025-08-22 DIAGNOSIS — J45.20 MILD INTERMITTENT ASTHMA WITHOUT COMPLICATION: ICD-10-CM

## 2025-08-22 DIAGNOSIS — E55.9 VITAMIN D DEFICIENCY: ICD-10-CM

## 2025-08-22 PROCEDURE — 99396 PREV VISIT EST AGE 40-64: CPT | Performed by: INTERNAL MEDICINE

## 2025-08-22 SDOH — ECONOMIC STABILITY: FOOD INSECURITY: WITHIN THE PAST 12 MONTHS, THE FOOD YOU BOUGHT JUST DIDN'T LAST AND YOU DIDN'T HAVE MONEY TO GET MORE.: NEVER TRUE

## 2025-08-22 SDOH — ECONOMIC STABILITY: FOOD INSECURITY: WITHIN THE PAST 12 MONTHS, YOU WORRIED THAT YOUR FOOD WOULD RUN OUT BEFORE YOU GOT MONEY TO BUY MORE.: NEVER TRUE

## 2025-08-22 ASSESSMENT — ENCOUNTER SYMPTOMS
SHORTNESS OF BREATH: 0
COUGH: 0
ABDOMINAL PAIN: 0
VOMITING: 0
NAUSEA: 0
EYES NEGATIVE: 1
DIARRHEA: 0
CONSTIPATION: 0

## 2025-08-22 ASSESSMENT — PATIENT HEALTH QUESTIONNAIRE - PHQ9
SUM OF ALL RESPONSES TO PHQ QUESTIONS 1-9: 0
2. FEELING DOWN, DEPRESSED OR HOPELESS: NOT AT ALL
SUM OF ALL RESPONSES TO PHQ QUESTIONS 1-9: 0
1. LITTLE INTEREST OR PLEASURE IN DOING THINGS: NOT AT ALL
SUM OF ALL RESPONSES TO PHQ QUESTIONS 1-9: 0
SUM OF ALL RESPONSES TO PHQ QUESTIONS 1-9: 0